# Patient Record
Sex: FEMALE | Race: WHITE | Employment: UNEMPLOYED | URBAN - METROPOLITAN AREA
[De-identification: names, ages, dates, MRNs, and addresses within clinical notes are randomized per-mention and may not be internally consistent; named-entity substitution may affect disease eponyms.]

---

## 2019-03-27 ENCOUNTER — OFFICE VISIT (OUTPATIENT)
Dept: HEMATOLOGY | Age: 69
End: 2019-03-27

## 2019-03-27 ENCOUNTER — HOSPITAL ENCOUNTER (OUTPATIENT)
Dept: LAB | Age: 69
Discharge: HOME | End: 2019-03-27
Payer: COMMERCIAL

## 2019-03-27 VITALS
DIASTOLIC BLOOD PRESSURE: 48 MMHG | SYSTOLIC BLOOD PRESSURE: 130 MMHG | OXYGEN SATURATION: 98 % | TEMPERATURE: 97.5 F | WEIGHT: 293 LBS | BODY MASS INDEX: 48.82 KG/M2 | HEART RATE: 88 BPM | HEIGHT: 65 IN

## 2019-03-27 DIAGNOSIS — K74.60 CIRRHOSIS OF LIVER WITH ASCITES, UNSPECIFIED HEPATIC CIRRHOSIS TYPE (HCC): ICD-10-CM

## 2019-03-27 DIAGNOSIS — R18.8 CIRRHOSIS OF LIVER WITH ASCITES, UNSPECIFIED HEPATIC CIRRHOSIS TYPE (HCC): Primary | ICD-10-CM

## 2019-03-27 DIAGNOSIS — Z11.59 ENCOUNTER FOR SCREENING FOR OTHER VIRAL DISEASES: ICD-10-CM

## 2019-03-27 DIAGNOSIS — K74.60 CIRRHOSIS OF LIVER WITH ASCITES, UNSPECIFIED HEPATIC CIRRHOSIS TYPE (HCC): Primary | ICD-10-CM

## 2019-03-27 DIAGNOSIS — R18.8 CIRRHOSIS OF LIVER WITH ASCITES, UNSPECIFIED HEPATIC CIRRHOSIS TYPE (HCC): ICD-10-CM

## 2019-03-27 PROBLEM — E66.01 OBESITY, MORBID (HCC): Status: ACTIVE | Noted: 2019-03-27

## 2019-03-27 PROCEDURE — 82103 ALPHA-1-ANTITRYPSIN TOTAL: CPT

## 2019-03-27 PROCEDURE — 86704 HEP B CORE ANTIBODY TOTAL: CPT

## 2019-03-27 PROCEDURE — 36415 COLL VENOUS BLD VENIPUNCTURE: CPT

## 2019-03-27 PROCEDURE — 83516 IMMUNOASSAY NONANTIBODY: CPT

## 2019-03-27 PROCEDURE — 82728 ASSAY OF FERRITIN: CPT

## 2019-03-27 PROCEDURE — 86708 HEPATITIS A ANTIBODY: CPT

## 2019-03-27 PROCEDURE — 82390 ASSAY OF CERULOPLASMIN: CPT

## 2019-03-27 PROCEDURE — 83540 ASSAY OF IRON: CPT

## 2019-03-27 PROCEDURE — 86038 ANTINUCLEAR ANTIBODIES: CPT

## 2019-03-27 PROCEDURE — 86256 FLUORESCENT ANTIBODY TITER: CPT

## 2019-03-27 PROCEDURE — 86706 HEP B SURFACE ANTIBODY: CPT

## 2019-03-27 RX ORDER — METFORMIN HYDROCHLORIDE 500 MG/1
TABLET ORAL 2 TIMES DAILY WITH MEALS
COMMUNITY

## 2019-03-27 RX ORDER — FLUTICASONE PROPIONATE AND SALMETEROL 500; 50 UG/1; UG/1
1 POWDER RESPIRATORY (INHALATION) EVERY 12 HOURS
COMMUNITY

## 2019-03-27 RX ORDER — SPIRONOLACTONE 100 MG/1
300 TABLET, FILM COATED ORAL DAILY
Qty: 90 TAB | Refills: 3 | Status: SHIPPED | OUTPATIENT
Start: 2019-03-27

## 2019-03-27 RX ORDER — CYCLOBENZAPRINE HCL 10 MG
TABLET ORAL
COMMUNITY

## 2019-03-27 RX ORDER — DICLOFENAC SODIUM 10 MG/G
GEL TOPICAL 4 TIMES DAILY
Status: ON HOLD | COMMUNITY
End: 2019-06-10

## 2019-03-27 RX ORDER — ALBUTEROL SULFATE 90 UG/1
AEROSOL, METERED RESPIRATORY (INHALATION)
COMMUNITY

## 2019-03-27 RX ORDER — GUAIFENESIN 100 MG/5ML
81 LIQUID (ML) ORAL DAILY
Status: ON HOLD | COMMUNITY
End: 2019-06-10

## 2019-03-27 RX ORDER — LISINOPRIL 10 MG/1
TABLET ORAL DAILY
COMMUNITY

## 2019-03-27 RX ORDER — LANCETS
EACH MISCELLANEOUS
COMMUNITY

## 2019-03-27 RX ORDER — IRON POLYSACCHARIDE COMPLEX 150 MG
150 CAPSULE ORAL 2 TIMES DAILY
Status: ON HOLD | COMMUNITY
End: 2019-06-10

## 2019-03-27 RX ORDER — FUROSEMIDE 40 MG/1
TABLET ORAL DAILY
COMMUNITY

## 2019-03-27 RX ORDER — SIMVASTATIN 40 MG/1
TABLET, FILM COATED ORAL
COMMUNITY
End: 2019-07-17

## 2019-03-27 RX ORDER — SPIRONOLACTONE 25 MG/1
TABLET ORAL DAILY
COMMUNITY
End: 2019-03-27

## 2019-03-27 RX ORDER — OMEPRAZOLE 40 MG/1
40 CAPSULE, DELAYED RELEASE ORAL DAILY
Status: ON HOLD | COMMUNITY
End: 2019-06-10

## 2019-03-27 RX ORDER — ALLOPURINOL 100 MG/1
TABLET ORAL DAILY
COMMUNITY

## 2019-03-27 RX ORDER — GABAPENTIN 300 MG/1
300 CAPSULE ORAL 3 TIMES DAILY
COMMUNITY

## 2019-03-27 NOTE — PROGRESS NOTES
1. Have you been to the ER, urgent care clinic since your last visit? Hospitalized since your last visit? Yes, Jatinder Hickman    2. Have you seen or consulted any other health care providers outside of the [facility] since your last visit? Include any pap smears or colon screening.  No

## 2019-03-27 NOTE — PROGRESS NOTES
[address] [address] [address], [place], [address], [place], [place]       Frank Tan, NICKY Call, PA-C    Nikhil Kirk, ACNP-BC   Gary Wong, NICKY Sung, NICKY [place] [facility] 136    at [address], [address] Yasmin Osman  22.    [phone]    FAX: [address]    [facility]    [address], [address] [address]    [address]    [phone]    FAX: [phone]       No care team member to display      Problem List  Never Reviewed          Codes Class Noted    Other cirrhosis of liver ([facility].) ICD-10-CM: K74.69  ICD-9-CM: 571.5  3/31/2019        COPD (chronic obstructive pulmonary disease) ([facility].) ICD-10-CM: J44.9  ICD-9-CM: 496  3/31/2019        Hypertension ICD-10-CM: I10  ICD-9-CM: 401.9  3/31/2019        Hyperlipidemia ICD-10-CM: E78.5  ICD-9-CM: 272.4  3/31/2019        Type 2 diabetes mellitus without complication ([facility].) RSV-08-CM: E11.9  ICD-9-CM: 250.00  3/31/2019        GERD (gastroesophageal reflux disease) ICD-10-CM: K21.9  ICD-9-CM: 530.81  3/31/2019        Asthma ICD-10-CM: J45.909  ICD-9-CM: 493.90  3/31/2019        Thrombocytopenia ([facility] 75.) ICD-10-CM: D69.6  ICD-9-CM: 287.5  3/31/2019        Anasarca ICD-10-CM: R60.1  ICD-9-CM: 782.3  3/31/2019        Obesity, morbid ([facility] 75.) ICD-10-CM: E66.01  ICD-9-CM: 278.01  3/27/2019              The clinicians listed above have asked me to see Bg Jerez in consultation regarding management of cirrhosis which is presumed secondary to non-alcoholic steatohepatitis (KAPLAN). All medical records sent by the referring physicians were reviewed including imaging studies. The patient is a 76 y.o.   female who was found to have chronic liver disease and cirrhosis in 3/2019 when she went to the ER for shortness of breath, 30-40 pound weight gain over 3 week period. An assessment of liver fibrosis with biopsy or elastography has not been performed. Serologic evaluation for markers of chronic liver disease was negative for HCV, HBV,     The patient has developed the following complications of cirrhosis: edema,     The patient notes fatigue, shortness of breath, swelling of the abdomen, swelling of the lower extremities,     The patient has not experienced pain in the right side over the liver, yellowing of the eyes or skin,      The patient completes all daily activities without any functional limitations. ASSESSMENT AND PLAN:  Cirrhosis  Cirrhosis of unclear etiology. The diagnosis of cirrhosis is based upon imaging, laboratory studies, complications of cirrhosis. AST is elevated. ALT is normal.  ALP is elevated. Liver function is depressed. The platelet count is depressed. Based upon laboratory studies and imaging the patient appears to have cirrhosis. Have performed laboratory testing to monitor liver function and degree of liver injury. This included BMP, hepatic panel, CBC with platelet count, INR. Will perform additional serologic tests to screen for other causes of chronic liver disease. The patient has depressed but stable liver function. Unable to calculate MELD at this time. No INR results available. Ascites   Ascites is only small amount as suggested by CT scan. Anasarca   Will increase the dose of diuretics to step 3. The patient was counseled regarding the need to maintain sodium restriction and the types of foods containing high amounts of sodium to be avoided. Lower extremity edema  Edema is persistent despite current dose of diuretics. Will increase doses of diuretics to step 3. Screening for Esophageal varices   The patient has not had an EGD to screen for varices. Will schedule for EGD to assess for varices and need for banding.     Hepatic encephalopathy   Overt HE has not developed to date. There is no need for treatment with lactulose and/or Xifaxan at this time. There is no need to restrict dietary protein at this time. Thrombocytopenia   This is secondary to cirrhosis. There is no evidence of overt bleeding. No treatment is required. Anemia   This is of unclear etiology. Will obtain FE panel to assess for iron stores. Screening for Hepatocellular Carcinoma  HCC screening has recently been performed and does not suggest [address] The next liver imaging study will be performed in 9/2019. Treatment of other medical problems in patients with chronic liver disease  There are no contraindications for the patient to take most medications that are necessary for treatment of other medical issues. The patient has cirrhrosis and should avoid the following medications:  Benzodiazapines which could exacerbate HE.  NSAIDs which are associated with a higher rate of developing ROSELINE. The patient can take the following medications as these will not impact the patient's current liver disease. Any medications utilized for treatment of DM  Statins to treat hypercholesterolemia  The patient does not consume alcohol on a daily basis. Normal doses of acetaminophen, as recommended on the label of the bottle, are not hepatotoxic except in the setting of daily alcohol use, even in patients with cirrhosis and can be utilized for pain. Counseling for alcohol in patients with chronic liver disease  The patient has cirrhosis and was advised to be abstinent from all alcohol including non-alcoholic beer which does contain some alcohol. The patient has not consumed alcohol since 2001. Osteoporosis  The risk of osteoporosis is increased in patients with cirrhosis. DEXA bone density to assess for osteoporosis has not been performed. This should be ordered by the patients primary care physician.     Vaccinations   The need for vaccination against viral hepatitis A and B will be assessed with serologic and instituted as appropriate. Since the patient does not have a chronic liver disease which can lead to liver injury screening for HAV and HBV is not needed. Routine vaccinations against other bacterial and viral agents can be performed as indicated. Annual flu vaccination should be administered if indicated. ALLERGIES  Allergies   Allergen Reactions    Benadryl [Diphenhydramine Hcl] Anaphylaxis    Ibuprofen Anaphylaxis    Bactrim [Sulfamethoprim] Rash    Penicillins Angioedema       MEDICATIONS  Current Outpatient Medications   Medication Sig    albuterol (PROVENTIL HFA, VENTOLIN HFA, PROAIR HFA) 90 mcg/actuation inhaler Take  by inhalation.  allopurinol (ZYLOPRIM) 100 mg tablet Take  by mouth daily.  aspirin 81 mg chewable tablet Take 81 mg by mouth daily.  cyclobenzaprine (FLEXERIL) 10 mg tablet Take  by mouth three (3) times daily as needed for Muscle Spasm(s).  diclofenac (VOLTAREN) 1 % gel Apply  to affected area four (4) times daily.  fluticasone propionate (FLOVENT DISKUS) 50 mcg/actuation inhaler Take  by inhalation.  fluticasone propion-salmeterol (ADVAIR) 500-50 mcg/dose diskus inhaler Take 1 Puff by inhalation every twelve (12) hours.  furosemide (LASIX) 40 mg tablet Take  by mouth daily.  gabapentin (NEURONTIN) 300 mg capsule Take 300 mg by mouth three (3) times daily.  iron polysaccharides (NIFEREX) 150 mg iron capsule Take 150 mg by mouth two (2) times a day.  lancets misc by Does Not Apply route.  lisinopril (PRINIVIL, ZESTRIL) 10 mg tablet Take  by mouth daily.  metFORMIN (GLUCOPHAGE) 500 mg tablet Take  by mouth two (2) times daily (with meals).  omeprazole (PRILOSEC) 40 mg capsule Take 40 mg by mouth daily.  simvastatin (ZOCOR) 40 mg tablet Take  by mouth nightly.  spironolactone (ALDACTONE) 100 mg tablet Take 3 Tabs by mouth daily. Indications:  Accumulation of Fluid caused by Cirrhosis of the Liver     No current facility-administered medications for this visit. SYSTEM REVIEW NOT RELATED TO LIVER DISEASE OR REVIEWED ABOVE:  Constitution systems: Negative for fever, chills, + weight gain, anasarca  Eyes: Negative for visual changes. ENT: Negative for sore throat, painful swallowing. Respiratory: Negative for cough, hemoptysis, +SOB. Cardiology: Negative for chest pain, palpitations. GI:  Negative for constipation or diarrhea. : Negative for urinary frequency, dysuria, hematuria, nocturia. Skin: Negative for rash. Hematology: +easy bruising, blood clots. Musculo-skelatal: Negative for back pain, muscle pain, weakness. Neurologic: Negative for headaches, dizziness, vertigo, memory problems not related to HE. Psychology: Negative for anxiety, depression. FAMILY HISTORY:  The father  of unknown cause. The mother  of cirrhosis r/t alcohol. There is no family history of immune disorders. SOCIAL HISTORY:  The patient is . The patient has 2 children, and 12 grandchildren. The patient stopped using tobacco products in . The patient has never consumed significant amounts of alcohol. The patient has been abstinent from alcohol since . The patient retired in  a  at [employer]. PHYSICAL EXAMINATION:  Visit Vitals  /48 (BP 1 Location: Left arm, BP Patient Position: Sitting)   Pulse 88   Temp 97.5 °F (36.4 °C)   Ht 5' 5\" (1.651 m)   Wt 331 lb (150.1 kg)   SpO2 98%   BMI 55.08 kg/m²     General: No acute distress. Eyes: Sclera anicteric. ENT: No oral lesions. Thyroid normal.  Nodes: No adenopathy. Skin: No spider angiomata. No jaundice. No palmar erythema. Respiratory: Lungs clear to auscultation. Cardiovascular: Regular heart rate. No murmurs. No JVD. Abdomen: Soft non-tender. Unable to palpate for liver or spleen due to body habitus  Extremities: 4+ pitting edema No muscle wasting.   No gross arthritic changes. Neurologic: Alert and oriented. Cranial nerves grossly intact. No asterixis. LABORATORY STUDIES:  From 3/2019  AST/ALT/ALP/T Bili/ALB: 99/35/138/2.8/2.5  WBC/HB/PLT/INR: 4.1/9.1/89  BUN/CREAT: 14/0.6    SEROLOGIES:  3/2019. HBsurface antigen negative, anti-HCV negative  Other testing will be performed as indicated. LIVER HISTOLOGY:  Not available or performed    ENDOSCOPIC PROCEDURES:  Not available or performed    RADIOLOGY:  2018. Ultrasound of liver. Echogenic consistent with cirrhosis. No liver mass lesions. No dilated bile ducts. No ascites. 3/2019. CT scan abdomen with and without IV contrast. Changes consistent with cirrhosis. No liver mass lesions. No dilated bile ducts. Mild ascites. OTHER TESTIN2018. ECHO- normal    FOLLOW-UP:  All of the issues listed above in the Assessment and Plan were discussed with the patient. All questions were answered. The patient expressed a clear understanding of the above. [address] [address] in 2 weeks to assess the effects and tolerability of step 3 diuretics.  BEKAH Sauceda-BC  [address] Liver [place] [facility]  [address], [address] [address]  [facility], [address]  [phone]

## 2019-03-28 LAB
FERRITIN SERPL-MCNC: 23 NG/ML (ref 8–388)
HBV SURFACE AB SER QL IA: NEGATIVE
HBV SURFACE AB SERPL IA-ACNC: <3.1 MIU/ML
HEP BS AB COMMENT,HBSAC: ABNORMAL
IRON SATN MFR SERPL: 11 %
IRON SERPL-MCNC: 36 UG/DL (ref 50–175)
TIBC SERPL-MCNC: 316 UG/DL (ref 250–450)

## 2019-03-29 LAB
A1AT SERPL-MCNC: 171 MG/DL (ref 90–200)
ACTIN IGG SERPL-ACNC: 26 UNITS (ref 0–19)
CERULOPLASMIN SERPL-MCNC: 24.7 MG/DL (ref 19–39)
HAV AB SER QL IA: POSITIVE
HBV CORE AB SERPL QL IA: NEGATIVE
MITOCHONDRIA M2 IGG SER-ACNC: <20 UNITS (ref 0–20)

## 2019-03-31 PROBLEM — E11.9 TYPE 2 DIABETES MELLITUS WITHOUT COMPLICATION (HCC): Status: ACTIVE | Noted: 2019-03-31

## 2019-03-31 PROBLEM — K21.9 GERD (GASTROESOPHAGEAL REFLUX DISEASE): Status: ACTIVE | Noted: 2019-03-31

## 2019-03-31 PROBLEM — R60.1 ANASARCA: Status: ACTIVE | Noted: 2019-03-31

## 2019-03-31 PROBLEM — K74.69 OTHER CIRRHOSIS OF LIVER (HCC): Status: ACTIVE | Noted: 2019-03-31

## 2019-03-31 PROBLEM — E78.5 HYPERLIPIDEMIA: Status: ACTIVE | Noted: 2019-03-31

## 2019-03-31 PROBLEM — D69.6 THROMBOCYTOPENIA (HCC): Status: ACTIVE | Noted: 2019-03-31

## 2019-03-31 PROBLEM — J45.909 ASTHMA: Status: ACTIVE | Noted: 2019-03-31

## 2019-03-31 PROBLEM — J44.9 COPD (CHRONIC OBSTRUCTIVE PULMONARY DISEASE) (HCC): Status: ACTIVE | Noted: 2019-03-31

## 2019-03-31 PROBLEM — I10 HYPERTENSION: Status: ACTIVE | Noted: 2019-03-31

## 2019-04-01 LAB
ANA HOMOGEN TITR SER: ABNORMAL {TITER}
ANA TITR SER IF: POSITIVE {TITER}
C-ANCA TITR SER IF: NORMAL TITER
NOTE:, 016270: ABNORMAL
P-ANCA ATYPICAL TITR SER IF: NORMAL TITER
P-ANCA TITR SER IF: NORMAL TITER

## 2019-04-04 ENCOUNTER — HOSPITAL ENCOUNTER (OUTPATIENT)
Dept: ULTRASOUND IMAGING | Age: 69
Discharge: HOME | End: 2019-04-04
Attending: NURSE PRACTITIONER
Payer: COMMERCIAL

## 2019-04-04 DIAGNOSIS — R18.8 CIRRHOSIS OF LIVER WITH ASCITES, UNSPECIFIED HEPATIC CIRRHOSIS TYPE (HCC): ICD-10-CM

## 2019-04-04 DIAGNOSIS — K74.60 CIRRHOSIS OF LIVER WITH ASCITES, UNSPECIFIED HEPATIC CIRRHOSIS TYPE (HCC): ICD-10-CM

## 2019-04-04 PROCEDURE — 76705 ECHO EXAM OF ABDOMEN: CPT

## 2019-04-08 ENCOUNTER — HOSPITAL ENCOUNTER (OUTPATIENT)
Dept: LAB | Age: 69
Discharge: HOME | End: 2019-04-08
Payer: COMMERCIAL

## 2019-04-08 ENCOUNTER — OFFICE VISIT (OUTPATIENT)
Dept: HEMATOLOGY | Age: 69
End: 2019-04-08

## 2019-04-08 VITALS
TEMPERATURE: 98.4 F | OXYGEN SATURATION: 94 % | HEIGHT: 65 IN | WEIGHT: 293 LBS | BODY MASS INDEX: 48.82 KG/M2 | DIASTOLIC BLOOD PRESSURE: 53 MMHG | HEART RATE: 89 BPM | SYSTOLIC BLOOD PRESSURE: 131 MMHG

## 2019-04-08 DIAGNOSIS — K74.60 CIRRHOSIS OF LIVER WITH ASCITES, UNSPECIFIED HEPATIC CIRRHOSIS TYPE (HCC): ICD-10-CM

## 2019-04-08 DIAGNOSIS — K74.60 CIRRHOSIS OF LIVER WITH ASCITES, UNSPECIFIED HEPATIC CIRRHOSIS TYPE (HCC): Primary | ICD-10-CM

## 2019-04-08 DIAGNOSIS — R18.8 CIRRHOSIS OF LIVER WITH ASCITES, UNSPECIFIED HEPATIC CIRRHOSIS TYPE (HCC): Primary | ICD-10-CM

## 2019-04-08 DIAGNOSIS — R18.8 CIRRHOSIS OF LIVER WITH ASCITES, UNSPECIFIED HEPATIC CIRRHOSIS TYPE (HCC): ICD-10-CM

## 2019-04-08 LAB
ALBUMIN SERPL-MCNC: 2.6 G/DL (ref 3.4–5)
ALBUMIN/GLOB SERPL: 0.7 {RATIO} (ref 0.8–1.7)
ALP SERPL-CCNC: 170 U/L (ref 45–117)
ALT SERPL-CCNC: 40 U/L (ref 13–56)
ANION GAP SERPL CALC-SCNC: 9 MMOL/L (ref 3–18)
AST SERPL-CCNC: 99 U/L (ref 15–37)
BASOPHILS # BLD: 0 K/UL (ref 0–0.1)
BASOPHILS NFR BLD: 1 % (ref 0–2)
BILIRUB DIRECT SERPL-MCNC: 1.8 MG/DL (ref 0–0.2)
BILIRUB SERPL-MCNC: 3.4 MG/DL (ref 0.2–1)
BUN SERPL-MCNC: 10 MG/DL (ref 7–18)
BUN/CREAT SERPL: 11 (ref 12–20)
CALCIUM SERPL-MCNC: 8.7 MG/DL (ref 8.5–10.1)
CHLORIDE SERPL-SCNC: 98 MMOL/L (ref 100–108)
CO2 SERPL-SCNC: 30 MMOL/L (ref 21–32)
CREAT SERPL-MCNC: 0.88 MG/DL (ref 0.6–1.3)
DIFFERENTIAL METHOD BLD: ABNORMAL
EOSINOPHIL # BLD: 0.2 K/UL (ref 0–0.4)
EOSINOPHIL NFR BLD: 4 % (ref 0–5)
ERYTHROCYTE [DISTWIDTH] IN BLOOD BY AUTOMATED COUNT: 17.8 % (ref 11.6–14.5)
GLOBULIN SER CALC-MCNC: 3.9 G/DL (ref 2–4)
GLUCOSE SERPL-MCNC: 91 MG/DL (ref 74–99)
HCT VFR BLD AUTO: 32.2 % (ref 35–45)
HGB BLD-MCNC: 10.2 G/DL (ref 12–16)
INR PPP: 1.5 (ref 0.8–1.2)
LYMPHOCYTES # BLD: 1 K/UL (ref 0.9–3.6)
LYMPHOCYTES NFR BLD: 20 % (ref 21–52)
MCH RBC QN AUTO: 28.8 PG (ref 24–34)
MCHC RBC AUTO-ENTMCNC: 31.7 G/DL (ref 31–37)
MCV RBC AUTO: 91 FL (ref 74–97)
MONOCYTES # BLD: 0.5 K/UL (ref 0.05–1.2)
MONOCYTES NFR BLD: 11 % (ref 3–10)
NEUTS SEG # BLD: 3.1 K/UL (ref 1.8–8)
NEUTS SEG NFR BLD: 64 % (ref 40–73)
PLATELET # BLD AUTO: 122 K/UL (ref 135–420)
PMV BLD AUTO: 9.1 FL (ref 9.2–11.8)
POTASSIUM SERPL-SCNC: 3.4 MMOL/L (ref 3.5–5.5)
PROT SERPL-MCNC: 6.5 G/DL (ref 6.4–8.2)
PROTHROMBIN TIME: 18 SEC (ref 11.5–15.2)
RBC # BLD AUTO: 3.54 M/UL (ref 4.2–5.3)
SODIUM SERPL-SCNC: 137 MMOL/L (ref 136–145)
WBC # BLD AUTO: 4.8 K/UL (ref 4.6–13.2)

## 2019-04-08 PROCEDURE — 85025 COMPLETE CBC W/AUTO DIFF WBC: CPT

## 2019-04-08 PROCEDURE — 85610 PROTHROMBIN TIME: CPT

## 2019-04-08 PROCEDURE — 36415 COLL VENOUS BLD VENIPUNCTURE: CPT

## 2019-04-08 PROCEDURE — 80076 HEPATIC FUNCTION PANEL: CPT

## 2019-04-08 PROCEDURE — 80048 BASIC METABOLIC PNL TOTAL CA: CPT

## 2019-04-08 NOTE — PROGRESS NOTES
[address] [facility], [place], FACP, [place], [place]      THAD Esparza, ACNP-BC     April Jorge Bundy, NICKY Leal, NICKY Stinson, NICKY [place] [facility] 136    at [address]    [address] [facility], [address] [place] [address], [facility] 22.    [phone]    FAX: [address]    at [address], [address]    [phone]    FAX: [phone]         Patient Care Team:  Jyoti Vu MD as PCP - General (Family Practice)      Problem List  Date Reviewed: 3/31/2019          Codes Class Noted    Cirrhosis [facility]) ICD-10-CM: K74.60  ICD-9-CM: 571.5  3/31/2019        COPD (chronic obstructive pulmonary disease) ([facility].) ICD-10-CM: J44.9  ICD-9-CM: 496  3/31/2019        Hypertension ICD-10-CM: I10  ICD-9-CM: 401.9  3/31/2019        Hyperlipidemia ICD-10-CM: E78.5  ICD-9-CM: 272.4  3/31/2019        Type 2 diabetes mellitus without complication ([facility].) NHJ-07-AR: E11.9  ICD-9-CM: 250.00  3/31/2019        GERD (gastroesophageal reflux disease) ICD-10-CM: K21.9  ICD-9-CM: 530.81  3/31/2019        Asthma ICD-10-CM: J45.909  ICD-9-CM: 493.90  3/31/2019        Thrombocytopenia ([facility].) ICD-10-CM: D69.6  ICD-9-CM: 287.5  3/31/2019        Anasarca ICD-10-CM: R60.1  ICD-9-CM: 782.3  3/31/2019        Obesity, morbid ([facility].) ICD-10-CM: E66.01  ICD-9-CM: 278.01  3/27/2019              Mich Kincaid returns to the [address] for management of cirrhosis of undefined etiology. The active problem list, all pertinent past medical history, medications, radiologic findings and laboratory findings related to the liver disorder were reviewed with the patient. The patient is a 76 y.o.   female who was found to have chronic liver disease and cirrhosis in 3/2019 when she developed weight gain and edema     An assessment of liver fibrosis with biopsy or elastography has not been performed. Serologic evaluation for markers of chronic liver disease was positive for CRYSTAL, ASMA. The patient has developed the following complications of cirrhosis: edema,     Since the last office appointment the patient has: The patient has had a marked improvement in edema and 30 pound weight loss on step 2 diuretics. The patient notes fatigue, swelling of the abdomen has resolved, less swelling of the lower extremities,     The patient has not experienced pain in the right side over the liver, yellowing of the eyes or skin,      The patient completes all daily activities without any functional limitations. ASSESSMENT AND PLAN:  Cirrhosis  Cirrhosis of unclear etiology. The diagnosis of cirrhosis is based upon imaging, laboratory studies, complications of cirrhosis. The CTP is 10. Child class C. The MELD score is 18. Serology is positive for CRYSTAL and ASMA and this may be AIH. Have performed laboratory testing to monitor liver function and degree of liver injury. This included BMP, hepatic panel, CBC with platelet count, INR. AST is elevated. ALT is normal.  ALP is elevated. Liver function is depressed. The platelet count is depressed. Ascites   Ascites has resolved with current dose of diuretics. The patient was counseled regarding the need to maintain sodium restriction and the types of foods containing high amounts of sodium to be avoided. Lower extremity edema  Edema is greatly improved on step 2 diuretics. She has lost about 30 pounds since the initial appointment 2 weeks ago. There is still some edema. Will increase doses of diuretics to step 3. The renal function is normal and edema should be able to be controled with diuretics. Screening for Esophageal varices   The patient has not had an EGD to screen for varices.     Will schedule for EGD to assess for varices and need for banding. Hepatic encephalopathy   HE has not developed to date. There is no need for treatment with lactulose and/or Xifaxan at this time. There is no need to restrict dietary protein at this time. Thrombocytopenia   This is secondary to cirrhosis. There is no evidence of overt bleeding. No treatment is required. Anemia   This is of unclear etiology. Will obtain FE panel to assess for iron stores. Screening for Hepatocellular Carcinoma  HCC screening has recently been performed and does not suggest [facility] The next liver imaging study will be performed in 9/2019. Treatment of other medical problems in patients with chronic liver disease  There are no contraindications for the patient to take most medications that are necessary for treatment of other medical issues. The patient has cirrhrosis and should avoid the following medications:  NSAIDs which are associated with a higher rate of developing ROSELINE. The patient can take the following medications as these will not impact the patient's current liver disease. Any medications utilized for treatment of DM  Statins to treat hypercholesterolemia    The patient does not consume alcohol on a daily basis. Normal doses of acetaminophen, as recommended on the label of the bottle, are not hepatotoxic except in the setting of daily alcohol use, even in patients with cirrhosis and can be utilized for pain. Counseling for alcohol in patients with chronic liver disease  The patient has cirrhosis and was advised to be abstinent from all alcohol including non-alcoholic beer which does contain some alcohol. The patient has not consumed alcohol since 2001. Osteoporosis  The risk of osteoporosis is increased in patients with cirrhosis. DEXA bone density to assess for osteoporosis has not been performed. This should be ordered by the patients primary care physician.     Vaccinations Vaccination for viral hepatitis B is recommended since the patient has no serologic evidence of previous exposure or vaccination with immunity. Vaccination for viral hepatitis A is not needed. The patient has serologic evidence of prior exposure or vaccination with immunity. Routine vaccinations against other bacterial and viral agents can be performed as indicated. Annual flu vaccination should be administered if indicated. ALLERGIES  Allergies   Allergen Reactions    Benadryl [Diphenhydramine Hcl] Anaphylaxis    Ibuprofen Anaphylaxis    Bactrim [Sulfamethoprim] Rash    Penicillins Angioedema       MEDICATIONS  Current Outpatient Medications   Medication Sig    albuterol (PROVENTIL HFA, VENTOLIN HFA, PROAIR HFA) 90 mcg/actuation inhaler Take  by inhalation.  allopurinol (ZYLOPRIM) 100 mg tablet Take  by mouth daily.  aspirin 81 mg chewable tablet Take 81 mg by mouth daily.  cyclobenzaprine (FLEXERIL) 10 mg tablet Take  by mouth three (3) times daily as needed for Muscle Spasm(s).  diclofenac (VOLTAREN) 1 % gel Apply  to affected area four (4) times daily.  fluticasone propionate (FLOVENT DISKUS) 50 mcg/actuation inhaler Take  by inhalation.  fluticasone propion-salmeterol (ADVAIR) 500-50 mcg/dose diskus inhaler Take 1 Puff by inhalation every twelve (12) hours.  furosemide (LASIX) 40 mg tablet Take  by mouth daily.  gabapentin (NEURONTIN) 300 mg capsule Take 300 mg by mouth three (3) times daily.  iron polysaccharides (NIFEREX) 150 mg iron capsule Take 150 mg by mouth two (2) times a day.  lancets misc by Does Not Apply route.  lisinopril (PRINIVIL, ZESTRIL) 10 mg tablet Take  by mouth daily.  metFORMIN (GLUCOPHAGE) 500 mg tablet Take  by mouth two (2) times daily (with meals).  omeprazole (PRILOSEC) 40 mg capsule Take 40 mg by mouth daily.  simvastatin (ZOCOR) 40 mg tablet Take  by mouth nightly.     spironolactone (ALDACTONE) 100 mg tablet Take 3 Tabs by mouth daily. Indications: Accumulation of Fluid caused by Cirrhosis of the Liver     No current facility-administered medications for this visit. SYSTEM REVIEW NOT RELATED TO LIVER DISEASE OR REVIEWED ABOVE:  Constitution systems: Negative for fever, chills,   Eyes: Negative for visual changes. ENT: Negative for sore throat, painful swallowing. Respiratory: Negative for cough, hemoptysis,   Cardiology: Negative for chest pain, palpitations. GI:  Negative for constipation or diarrhea. : Negative for urinary frequency, dysuria, hematuria, nocturia. Skin: Negative for rash. Hematology: easy bruising,   Musculo-skelatal: Negative for back pain, muscle pain, weakness. Neurologic: Negative for headaches, dizziness, vertigo, memory problems not related to HE. Psychology: Negative for anxiety, depression. FAMILY HISTORY:  The father  of unknown cause. The mother  of cirrhosis r/t alcohol. There is no family history of immune disorders. SOCIAL HISTORY:  The patient is . The patient has 2 children, and 12 grandchildren. The patient stopped using tobacco products in . The patient has never consumed significant amounts of alcohol. The patient has been abstinent from alcohol since . The patient retired in  a  at [employer]. PHYSICAL EXAMINATION:  Visit Vitals  /53   Pulse 89   Temp 98.4 °F (36.9 °C) (Tympanic)   Ht 5' 5\" (1.651 m)   Wt 302 lb 4 oz (137.1 kg)   SpO2 94%   BMI 50.30 kg/m²     General: No acute distress. Eyes: Sclera anicteric. ENT: No oral lesions. Thyroid normal.  Nodes: No adenopathy. Skin: No spider angiomata. No jaundice. No palmar erythema. Respiratory: Lungs clear to auscultation. Cardiovascular: Regular heart rate. No murmurs. No JVD. Abdomen: Soft non-tender. Unable to palpate for liver or spleen due to body habitus. No obvious ascites  Extremities: 2+ pitting edema No muscle wasting.   No gross arthritic changes. Neurologic: Alert and oriented. Cranial nerves grossly intact. No asterixis. LABORATORY STUDIES:  Liver [place] of [address] Units 2019   WBC 4.6 - 13.2 K/uL 4.8   ANC 1.8 - 8.0 K/UL 3.1   HGB 12.0 - 16.0 g/dL 10.2 (L)    - 420 K/uL 122 (L)   INR 0.8 - 1.2   1.5 (H)   AST 15 - 37 U/L 99 (H)   ALT 13 - 56 U/L 40   Alk Phos 45 - 117 U/L 170 (H)   Bili, Total 0.2 - 1.0 MG/DL 3.4 (H)   Bili, Direct 0.0 - 0.2 MG/DL 1.8 (H)   Albumin 3.4 - 5.0 g/dL 2.6 (L)   BUN 7.0 - 18 MG/DL 10   Creat 0.6 - 1.3 MG/DL 0.88   Na 136 - 145 mmol/L 137   K 3.5 - 5.5 mmol/L 3.4 (L)   Cl 100 - 108 mmol/L 98 (L)   CO2 21 - 32 mmol/L 30   Glucose 74 - 99 mg/dL 91     SEROLOGIES:  3/2019. HBsurface antigen negative, anti-HCV negative    Serologies Latest Ref Rng & Units 3/27/2019   Hep A Ab, Total NEGATIVE   Positive (A)   Hep B Core Ab, Total NEGATIVE   NEGATIVE   Hep B Surface Ab >10.0 mIU/mL <3.10 (L)   Hep B Surface Ab Interp POS   NEGATIVE (A)   Ferritin 8 - 388 NG/ML 23   Iron % Saturation % 11   CRYSTAL, IFA  Positive (A)   CRYSTAL Pattern  1:160 (H)   C-ANCA Neg:<1:20 titer <1:20   P-ANCA Neg:<1:20 titer <1:20   ANCA Neg:<1:20 titer <1:20   ASMCA 0 - 19 Units 26 (H)   M2 Ab 0.0 - 20.0 Units <20.0   Ceruloplasmin 19.0 - 39.0 mg/dL 24.7   Alpha-1 antitrypsin level 90 - 200 mg/dL 171     LIVER HISTOLOGY:  Not available or performed    ENDOSCOPIC PROCEDURES:  Not available or performed    RADIOLOGY:  2018. Ultrasound of liver. Echogenic consistent with cirrhosis. No liver mass lesions. No dilated bile ducts. No ascites. 3/2019. CT scan abdomen with and without IV contrast. Changes consistent with cirrhosis. No liver mass lesions. No dilated bile ducts. Mild ascites. OTHER TESTIN2018. ECHO- normal    FOLLOW-UP:  All of the issues listed above in the Assessment and Plan were discussed with the patient. All questions were answered.   The patient expressed a clear understanding of the above.    [address] [address] in 2 weeks to assess the effects and tolerability of step 3 diuretics.  MD Joao Wiseman 13 of [facility]  [address] [facility], [facility], [address]  12 [facility]

## 2019-04-24 ENCOUNTER — HOSPITAL ENCOUNTER (OUTPATIENT)
Dept: LAB | Age: 69
Discharge: HOME | End: 2019-04-24
Payer: COMMERCIAL

## 2019-04-24 ENCOUNTER — OFFICE VISIT (OUTPATIENT)
Dept: HEMATOLOGY | Age: 69
End: 2019-04-24

## 2019-04-24 VITALS
DIASTOLIC BLOOD PRESSURE: 54 MMHG | HEART RATE: 94 BPM | BODY MASS INDEX: 46.32 KG/M2 | RESPIRATION RATE: 18 BRPM | SYSTOLIC BLOOD PRESSURE: 122 MMHG | WEIGHT: 278 LBS | OXYGEN SATURATION: 96 % | HEIGHT: 65 IN | TEMPERATURE: 98.8 F

## 2019-04-24 DIAGNOSIS — K74.60 CIRRHOSIS OF LIVER WITH ASCITES, UNSPECIFIED HEPATIC CIRRHOSIS TYPE (HCC): Primary | ICD-10-CM

## 2019-04-24 DIAGNOSIS — R18.8 CIRRHOSIS OF LIVER WITH ASCITES, UNSPECIFIED HEPATIC CIRRHOSIS TYPE (HCC): ICD-10-CM

## 2019-04-24 DIAGNOSIS — K74.60 CIRRHOSIS OF LIVER WITH ASCITES, UNSPECIFIED HEPATIC CIRRHOSIS TYPE (HCC): ICD-10-CM

## 2019-04-24 DIAGNOSIS — R18.8 CIRRHOSIS OF LIVER WITH ASCITES, UNSPECIFIED HEPATIC CIRRHOSIS TYPE (HCC): Primary | ICD-10-CM

## 2019-04-24 LAB
ALBUMIN SERPL-MCNC: 2.7 G/DL (ref 3.4–5)
ALBUMIN/GLOB SERPL: 0.7 {RATIO} (ref 0.8–1.7)
ALP SERPL-CCNC: 142 U/L (ref 45–117)
ALT SERPL-CCNC: 39 U/L (ref 13–56)
ANION GAP SERPL CALC-SCNC: 11 MMOL/L (ref 3–18)
AST SERPL-CCNC: 92 U/L (ref 15–37)
BASOPHILS # BLD: 0.1 K/UL (ref 0–0.1)
BASOPHILS NFR BLD: 1 % (ref 0–2)
BILIRUB DIRECT SERPL-MCNC: 1.7 MG/DL (ref 0–0.2)
BILIRUB SERPL-MCNC: 3.2 MG/DL (ref 0.2–1)
BUN SERPL-MCNC: 9 MG/DL (ref 7–18)
BUN/CREAT SERPL: 11 (ref 12–20)
CALCIUM SERPL-MCNC: 9.1 MG/DL (ref 8.5–10.1)
CHLORIDE SERPL-SCNC: 99 MMOL/L (ref 100–108)
CO2 SERPL-SCNC: 29 MMOL/L (ref 21–32)
CREAT SERPL-MCNC: 0.8 MG/DL (ref 0.6–1.3)
DIFFERENTIAL METHOD BLD: ABNORMAL
EOSINOPHIL # BLD: 0.1 K/UL (ref 0–0.4)
EOSINOPHIL NFR BLD: 2 % (ref 0–5)
ERYTHROCYTE [DISTWIDTH] IN BLOOD BY AUTOMATED COUNT: 18 % (ref 11.6–14.5)
GLOBULIN SER CALC-MCNC: 3.9 G/DL (ref 2–4)
GLUCOSE SERPL-MCNC: 78 MG/DL (ref 74–99)
HCT VFR BLD AUTO: 31.9 % (ref 35–45)
HGB BLD-MCNC: 10.3 G/DL (ref 12–16)
INR PPP: 1.4 (ref 0.8–1.2)
LYMPHOCYTES # BLD: 1.2 K/UL (ref 0.9–3.6)
LYMPHOCYTES NFR BLD: 25 % (ref 21–52)
MCH RBC QN AUTO: 29 PG (ref 24–34)
MCHC RBC AUTO-ENTMCNC: 32.3 G/DL (ref 31–37)
MCV RBC AUTO: 89.9 FL (ref 74–97)
MONOCYTES # BLD: 0.4 K/UL (ref 0.05–1.2)
MONOCYTES NFR BLD: 9 % (ref 3–10)
NEUTS SEG # BLD: 3 K/UL (ref 1.8–8)
NEUTS SEG NFR BLD: 63 % (ref 40–73)
PLATELET # BLD AUTO: 105 K/UL (ref 135–420)
PMV BLD AUTO: 8.6 FL (ref 9.2–11.8)
POTASSIUM SERPL-SCNC: 3.5 MMOL/L (ref 3.5–5.5)
PROT SERPL-MCNC: 6.6 G/DL (ref 6.4–8.2)
PROTHROMBIN TIME: 17.4 SEC (ref 11.5–15.2)
RBC # BLD AUTO: 3.55 M/UL (ref 4.2–5.3)
SODIUM SERPL-SCNC: 139 MMOL/L (ref 136–145)
WBC # BLD AUTO: 4.7 K/UL (ref 4.6–13.2)

## 2019-04-24 PROCEDURE — 36415 COLL VENOUS BLD VENIPUNCTURE: CPT

## 2019-04-24 PROCEDURE — 85610 PROTHROMBIN TIME: CPT

## 2019-04-24 PROCEDURE — 80076 HEPATIC FUNCTION PANEL: CPT

## 2019-04-24 PROCEDURE — 85025 COMPLETE CBC W/AUTO DIFF WBC: CPT

## 2019-04-24 PROCEDURE — 80048 BASIC METABOLIC PNL TOTAL CA: CPT

## 2019-04-24 NOTE — Clinical Note
6/1/19 Patient: Cathy Lind YOB: 1950 Date of Visit: 4/24/2019 Kirk Bravo MD 
Patient Can Only Remember The Practice Name And Not The Physician 
VIA Dear Bernarda Bravo MD, Thank you for referring Ms. Keke Kincaid to [address] [place] [address] for evaluation. My notes for this consultation are attached. If you have questions, please do not hesitate to call me. I look forward to following your patient along with you. Sincerely, Pita James MD

## 2019-04-24 NOTE — PROGRESS NOTES
Christie Wilkes is a 76 y.o. female      1. Have you been to the ER, urgent care clinic or hospitalized since your last visit? NO.     2. Have you seen or consulted any other health care providers outside of the [address] [place] [address] since your last visit (Include any pap smears or colon screening)?  NON          Learning Assessment 4/24/2019   PRIMARY LEARNER Patient   BARRIERS PRIMARY LEARNER NONE   CO-LEARNER CAREGIVER No   PRIMARY LANGUAGE ENGLISH   LEARNER PREFERENCE PRIMARY LISTENING   ANSWERED BY PATIENT   RELATIONSHIP SELF

## 2019-05-30 ENCOUNTER — HOSPITAL ENCOUNTER (OUTPATIENT)
Dept: LAB | Age: 69
Discharge: HOME | End: 2019-05-30
Payer: COMMERCIAL

## 2019-05-30 ENCOUNTER — OFFICE VISIT (OUTPATIENT)
Dept: HEMATOLOGY | Age: 69
End: 2019-05-30

## 2019-05-30 VITALS
HEIGHT: 65 IN | WEIGHT: 271 LBS | OXYGEN SATURATION: 98 % | SYSTOLIC BLOOD PRESSURE: 104 MMHG | HEART RATE: 94 BPM | DIASTOLIC BLOOD PRESSURE: 53 MMHG | TEMPERATURE: 97.9 F | BODY MASS INDEX: 45.15 KG/M2

## 2019-05-30 DIAGNOSIS — R60.1 ANASARCA: ICD-10-CM

## 2019-05-30 DIAGNOSIS — K74.69 OTHER CIRRHOSIS OF LIVER (HCC): ICD-10-CM

## 2019-05-30 DIAGNOSIS — K74.69 OTHER CIRRHOSIS OF LIVER (HCC): Primary | ICD-10-CM

## 2019-05-30 LAB
ALBUMIN SERPL-MCNC: 2.6 G/DL (ref 3.4–5)
ALBUMIN/GLOB SERPL: 0.7 {RATIO} (ref 0.8–1.7)
ALP SERPL-CCNC: 134 U/L (ref 45–117)
ALT SERPL-CCNC: 38 U/L (ref 13–56)
ANION GAP SERPL CALC-SCNC: 9 MMOL/L (ref 3–18)
AST SERPL-CCNC: 98 U/L (ref 15–37)
BASOPHILS # BLD: 0.1 K/UL (ref 0–0.1)
BASOPHILS NFR BLD: 1 % (ref 0–2)
BILIRUB DIRECT SERPL-MCNC: 1.8 MG/DL (ref 0–0.2)
BILIRUB SERPL-MCNC: 3.4 MG/DL (ref 0.2–1)
BUN SERPL-MCNC: 15 MG/DL (ref 7–18)
BUN/CREAT SERPL: 13 (ref 12–20)
CALCIUM SERPL-MCNC: 8.8 MG/DL (ref 8.5–10.1)
CHLORIDE SERPL-SCNC: 98 MMOL/L (ref 100–108)
CO2 SERPL-SCNC: 29 MMOL/L (ref 21–32)
CREAT SERPL-MCNC: 1.19 MG/DL (ref 0.6–1.3)
DIFFERENTIAL METHOD BLD: ABNORMAL
EOSINOPHIL # BLD: 0.2 K/UL (ref 0–0.4)
EOSINOPHIL NFR BLD: 3 % (ref 0–5)
ERYTHROCYTE [DISTWIDTH] IN BLOOD BY AUTOMATED COUNT: 18.1 % (ref 11.6–14.5)
GLOBULIN SER CALC-MCNC: 4 G/DL (ref 2–4)
GLUCOSE SERPL-MCNC: 89 MG/DL (ref 74–99)
HCT VFR BLD AUTO: 33.1 % (ref 35–45)
HGB BLD-MCNC: 10.9 G/DL (ref 12–16)
INR PPP: 1.4 (ref 0.8–1.2)
LYMPHOCYTES # BLD: 1.2 K/UL (ref 0.9–3.6)
LYMPHOCYTES NFR BLD: 22 % (ref 21–52)
MCH RBC QN AUTO: 30.1 PG (ref 24–34)
MCHC RBC AUTO-ENTMCNC: 32.9 G/DL (ref 31–37)
MCV RBC AUTO: 91.4 FL (ref 74–97)
MONOCYTES # BLD: 0.6 K/UL (ref 0.05–1.2)
MONOCYTES NFR BLD: 12 % (ref 3–10)
NEUTS SEG # BLD: 3.4 K/UL (ref 1.8–8)
NEUTS SEG NFR BLD: 62 % (ref 40–73)
PLATELET # BLD AUTO: 118 K/UL (ref 135–420)
PMV BLD AUTO: 8.9 FL (ref 9.2–11.8)
POTASSIUM SERPL-SCNC: 4 MMOL/L (ref 3.5–5.5)
PROT SERPL-MCNC: 6.6 G/DL (ref 6.4–8.2)
PROTHROMBIN TIME: 16.7 SEC (ref 11.5–15.2)
RBC # BLD AUTO: 3.62 M/UL (ref 4.2–5.3)
SODIUM SERPL-SCNC: 136 MMOL/L (ref 136–145)
WBC # BLD AUTO: 5.5 K/UL (ref 4.6–13.2)

## 2019-05-30 PROCEDURE — 85025 COMPLETE CBC W/AUTO DIFF WBC: CPT

## 2019-05-30 PROCEDURE — 80076 HEPATIC FUNCTION PANEL: CPT

## 2019-05-30 PROCEDURE — 36415 COLL VENOUS BLD VENIPUNCTURE: CPT

## 2019-05-30 PROCEDURE — 85610 PROTHROMBIN TIME: CPT

## 2019-05-30 PROCEDURE — 80048 BASIC METABOLIC PNL TOTAL CA: CPT

## 2019-05-30 NOTE — PROGRESS NOTES
1. Have you been to the ER, urgent care clinic since your last visit? Hospitalized since your last visit? No    2. Have you seen or consulted any other health care providers outside of the [address] [place] [address] since your last visit? Include any pap smears or colon screening.  Yes

## 2019-05-30 NOTE — PROGRESS NOTES
Discussed increasing diuretics to step 4. Will repeat lab work when patient comes back for EGD on 6/10/19. Patient expressed understanding of directions.

## 2019-05-30 NOTE — PROGRESS NOTES
[address] [place] [address], MD, FACP, [place], [place]      THAD Danielle, [facility]     April Deepa Teresa, NP   Lan Burton, NICKY Bishop, NICKY Fontana [address]    at [facility]    [address] [facility], [address] [place] [address], [facility] 22.    [phone]    FAX: [address] [place] [address]    at [address], [address]    [phone]    FAX: [phone]       Patient Care Team:  Rita Cooney MD as PCP - General (Family Practice)      Problem List  Date Reviewed: 6/1/2019          Codes Class Noted    Cirrhosis ([facility] ICD-10-CM: K74.60  ICD-9-CM: 571.5  3/31/2019        COPD (chronic obstructive pulmonary disease) ([facility] ICD-10-CM: J44.9  ICD-9-CM: 496  3/31/2019        Hypertension ICD-10-CM: I10  ICD-9-CM: 401.9  3/31/2019        Hyperlipidemia ICD-10-CM: E78.5  ICD-9-CM: 272.4  3/31/2019        Type 2 diabetes mellitus without complication ([facility] TIB-08-NC: E11.9  ICD-9-CM: 250.00  3/31/2019        GERD (gastroesophageal reflux disease) ICD-10-CM: K21.9  ICD-9-CM: 530.81  3/31/2019        Asthma ICD-10-CM: J45.909  ICD-9-CM: 493.90  3/31/2019        Thrombocytopenia ([facility] ICD-10-CM: D69.6  ICD-9-CM: 287.5  3/31/2019        Anasarca ICD-10-CM: R60.1  ICD-9-CM: 782.3  3/31/2019        Obesity, morbid ([facility] ICD-10-CM: E66.01  ICD-9-CM: 278.01  3/27/2019              Liliana Kincaid returns to the [address] for management of cirrhosis of undefined etiology. The active problem list, all pertinent past medical history, medications, radiologic findings and laboratory findings related to the liver disorder were reviewed with the patient. The patient is a 76 y.o.   female who was found to have chronic liver disease and cirrhosis in 3/2019 when she developed weight gain and anasarca. The most recent imaging of the liver was Ultrasound performed in 4/2019. Results suggest cirrhosis. No liver mass lesions noted. Serologic evaluation for markers of chronic liver disease was positive for CRYSTAL, ASMA. The patient has developed the following complications of cirrhosis: edema. Since the last office appointment the patient has: The patient has had a marked improvement in edema and 50 pound weight loss with diuretics. The patient notes fatigue, swelling of the abdomen has resolved, swelling of the lower extremities has nearly resolved,     The patient has not experienced pain in the right side over the liver, yellowing of the eyes or skin,      The patient completes all daily activities without any functional limitations. ASSESSMENT AND PLAN:  Cirrhosis  Cirrhosis of unclear etiology. The diagnosis of cirrhosis is based upon imaging, laboratory studies, complications of cirrhosis. The CTP is 10. Child class C. The MELD score is 17. Have performed laboratory testing to monitor liver function and degree of liver injury. This included BMP, hepatic panel, CBC with platelet count, INR. AST is elevated. ALT is normal.  ALP is elevated. Liver function is depressed. The platelet count is depressed. Autoimmune Hepatitis   The diagnosis is supspected was based upon serology of CRYSTAL and ASMA  Histologic confirmation has not been performed. The patient is not currently receiving treatment for the liver disease. AST is elevated. ALT is normal.  ALP is elevated. Liver function is depressed. Total bilirubin is elevated. Serum albumin is depressed. INR is prolonged. The platelet count is depressed. Given her body weight and decompensated cirrhosis there is no plan to treat with immune suppression. There is therefore no reason to perform liver biopsy at this time.     Elastography performed in 4/2019 suggests stage stage 1 fibrosis. This is likely inaccurate because of her obesity and anasarca at the time of the exam.  Will consider repeat elastography after resolution of all anasarca. Ascites   Ascites has resolved with current dose of diuretics. The patient was counseled regarding the need to maintain sodium restriction and the types of foods containing high amounts of sodium to be avoided. Lower extremity edema  Edema is greatly improved on step 3 diuretics. She has lost about 50 pounds since the initial appointment 4 weeks ago. There is still mild edema. Will continue the current dose of diuretics at step 3. The renal function is normal and edema should be able to be controled with diuretics. Screening for Esophageal varices   The patient has not had an EGD to screen for varices. She is scheduled for EGD to assess for varices and need for banding 6/2019. Hepatic encephalopathy   HE has not developed to date. There is no need for treatment with lactulose and/or Xifaxan at this time. There is no need to restrict dietary protein at this time. Thrombocytopenia   This is secondary to cirrhosis. There is no evidence of overt bleeding. No treatment is required. Anemia   This is due to portal hypertension with chronic GI blood loss  The serum ferritin is depressed  The FE saturation is depressed  Will administer oral iron supplements. If the patient does not respond to oral FE will switch to intravenous iron. Will schedule for EGD to assess for UGI blood loss. Screening for Hepatocellular Carcinoma  HCC screening has recently been performed and does not suggest [address] The next liver imaging study will be performed in 9/2019. Treatment of other medical problems in patients with chronic liver disease  There are no contraindications for the patient to take most medications that are necessary for treatment of other medical issues.     The patient has cirrhrosis and should avoid the following medications:  NSAIDs which are associated with a higher rate of developing ROSELINE. The patient can take the following medications as these will not impact the patient's current liver disease. Any medications utilized for treatment of DM  Statins to treat hypercholesterolemia    The patient does not consume alcohol on a daily basis. Normal doses of acetaminophen, as recommended on the label of the bottle, are not hepatotoxic except in the setting of daily alcohol use, even in patients with cirrhosis and can be utilized for pain. Counseling for alcohol in patients with chronic liver disease  The patient has cirrhosis and was advised to be abstinent from all alcohol including non-alcoholic beer which does contain some alcohol. The patient has not consumed alcohol since 2001. Osteoporosis  The risk of osteoporosis is increased in patients with cirrhosis. DEXA bone density to assess for osteoporosis has not been performed. This should be ordered by the patients primary care physician. Vaccinations   Vaccination for viral hepatitis B is recommended since the patient has no serologic evidence of previous exposure or vaccination with immunity. Vaccination for viral hepatitis A is not needed. The patient has serologic evidence of prior exposure or vaccination with immunity. Routine vaccinations against other bacterial and viral agents can be performed as indicated. Annual flu vaccination should be administered if indicated. ALLERGIES  Allergies   Allergen Reactions    Benadryl [Diphenhydramine Hcl] Anaphylaxis    Ibuprofen Anaphylaxis    Bactrim [Sulfamethoprim] Rash    Penicillins Angioedema       MEDICATIONS  Current Outpatient Medications   Medication Sig    albuterol (PROVENTIL HFA, VENTOLIN HFA, PROAIR HFA) 90 mcg/actuation inhaler Take  by inhalation.  allopurinol (ZYLOPRIM) 100 mg tablet Take  by mouth daily.     aspirin 81 mg chewable tablet Take 81 mg by mouth daily.  cyclobenzaprine (FLEXERIL) 10 mg tablet Take  by mouth three (3) times daily as needed for Muscle Spasm(s).  diclofenac (VOLTAREN) 1 % gel Apply  to affected area four (4) times daily.  fluticasone propionate (FLOVENT DISKUS) 50 mcg/actuation inhaler Take  by inhalation.  fluticasone propion-salmeterol (ADVAIR) 500-50 mcg/dose diskus inhaler Take 1 Puff by inhalation every twelve (12) hours.  furosemide (LASIX) 40 mg tablet Take  by mouth daily.  gabapentin (NEURONTIN) 300 mg capsule Take 300 mg by mouth three (3) times daily.  iron polysaccharides (NIFEREX) 150 mg iron capsule Take 150 mg by mouth two (2) times a day.  lancets misc by Does Not Apply route.  lisinopril (PRINIVIL, ZESTRIL) 10 mg tablet Take  by mouth daily.  metFORMIN (GLUCOPHAGE) 500 mg tablet Take  by mouth two (2) times daily (with meals).  omeprazole (PRILOSEC) 40 mg capsule Take 40 mg by mouth daily.  spironolactone (ALDACTONE) 100 mg tablet Take 3 Tabs by mouth daily. Indications: Accumulation of Fluid caused by Cirrhosis of the Liver    simvastatin (ZOCOR) 40 mg tablet Take  by mouth nightly. No current facility-administered medications for this visit. SYSTEM REVIEW NOT RELATED TO LIVER DISEASE OR REVIEWED ABOVE:  Constitution systems: Negative for fever, chills,   Eyes: Negative for visual changes. ENT: Negative for sore throat, painful swallowing. Respiratory: Negative for cough, hemoptysis,   Cardiology: Negative for chest pain, palpitations. GI:  Negative for constipation or diarrhea. : Negative for urinary frequency, dysuria, hematuria, nocturia. Skin: Negative for rash. Hematology: easy bruising,   Musculo-skelatal: Negative for back pain, muscle pain, weakness. Neurologic: Negative for headaches, dizziness, vertigo, memory problems not related to HE. Psychology: Negative for anxiety, depression. FAMILY HISTORY:  The father  of unknown cause.   The mother  of cirrhosis r/t alcohol. There is no family history of immune disorders. SOCIAL HISTORY:  The patient is . The patient has 2 children, and 12 grandchildren. The patient stopped using tobacco products in . The patient has never consumed significant amounts of alcohol. The patient has been abstinent from alcohol since . The patient retired in  a  at [employer]. PHYSICAL EXAMINATION:  Visit Vitals  /53 (BP 1 Location: Right arm, BP Patient Position: Sitting)   Pulse 94   Temp 97.9 °F (36.6 °C)   Ht 5' 5\" (1.651 m)   Wt 271 lb (122.9 kg)   SpO2 98%   BMI 45.10 kg/m²     General: No acute distress. Eyes: Sclera anicteric. ENT: No oral lesions. Thyroid normal.  Nodes: No adenopathy. Skin: No spider angiomata. No jaundice. No palmar erythema. Respiratory: Lungs clear to auscultation. Cardiovascular: Regular heart rate. No murmurs. No JVD. Abdomen: Soft non-tender. Unable to palpate for liver or spleen due to body habitus. No obvious ascites  Extremities: 1+ edema   Neurologic: Alert and oriented. Cranial nerves grossly intact. No asterixis. LABORATORY STUDIES:  Liver [place] of [address] & Units 2019   WBC 4.6 - 13.2 K/uL 5.5 4.7   ANC 1.8 - 8.0 K/UL 3.4 3.0   HGB 12.0 - 16.0 g/dL 10.9 (L) 10.3 (L)    - 420 K/uL 118 (L) 105 (L)   INR 0.8 - 1.2   1.4 (H) 1.4 (H)   AST 15 - 37 U/L 98 (H) 92 (H)   ALT 13 - 56 U/L 38 39   Alk Phos 45 - 117 U/L 134 (H) 142 (H)   Bili, Total 0.2 - 1.0 MG/DL 3.4 (H) 3.2 (H)   Bili, Direct 0.0 - 0.2 MG/DL 1.8 (H) 1.7 (H)   Albumin 3.4 - 5.0 g/dL 2.6 (L) 2.7 (L)   BUN 7.0 - 18 MG/DL 15 9   Creat 0.6 - 1.3 MG/DL 1.19 0.80   Na 136 - 145 mmol/L 136 139   K 3.5 - 5.5 mmol/L 4.0 3.5   Cl 100 - 108 mmol/L 98 (L) 99 (L)   CO2 21 - 32 mmol/L 29 29   Glucose 74 - 99 mg/dL 89 78     SEROLOGIES:  3/2019.   HBsurface antigen negative, anti-HCV negative    Serologies Latest Ref Rng & Units 3/27/2019   Hep A Ab, Total NEGATIVE   Positive (A)   Hep B Core Ab, Total NEGATIVE   NEGATIVE   Hep B Surface Ab >10.0 mIU/mL <3.10 (L)   Hep B Surface Ab Interp POS   NEGATIVE (A)   Ferritin 8 - 388 NG/ML 23   Iron % Saturation % 11   CRYSTAL, IFA  Positive (A)   CRYSTAL Pattern  1:160 (H)   C-ANCA Neg:<1:20 titer <1:20   P-ANCA Neg:<1:20 titer <1:20   ANCA Neg:<1:20 titer <1:20   ASMCA 0 - 19 Units 26 (H)   M2 Ab 0.0 - 20.0 Units <20.0   Ceruloplasmin 19.0 - 39.0 mg/dL 24.7   Alpha-1 antitrypsin level 90 - 200 mg/dL 171     LIVER HISTOLOGY:  2019. Sheer wave elastography performed at [facility]. E range: 3.16- 10.33 kPa. E mean: 6.58 kPa. E median: 7.56 kPa. E standard: 2.49 kPa. The results suggested a fibrosis level of F1.    ENDOSCOPIC PROCEDURES:  Not available or performed    RADIOLOGY:  2018. Ultrasound of liver. Echogenic consistent with cirrhosis. No liver mass lesions. No dilated bile ducts. No ascites. 3/2019. CT scan abdomen with and without IV contrast. Changes consistent with cirrhosis. No liver mass lesions. No dilated bile ducts. Mild ascites. OTHER TESTIN2018. ECHO- normal    FOLLOW-UP:  All of the issues listed above in the Assessment and Plan were discussed with the patient. All questions were answered. The patient expressed a clear understanding of the above. [address] [address] in 4 weeks for monitoring of cirrhosis.       BEKAH [facility]. Ricardo Hardin 144 Liver [place] of [address] [place] [address] Road,[address]  [address], [facility] [address]  [facility], [address] [phone]

## 2019-06-01 PROBLEM — K74.60 CIRRHOSIS (HCC): Status: ACTIVE | Noted: 2019-03-31

## 2019-06-10 ENCOUNTER — HOSPITAL ENCOUNTER (OUTPATIENT)
Age: 69
Setting detail: OUTPATIENT SURGERY
Discharge: HOME | End: 2019-06-10
Attending: INTERNAL MEDICINE | Admitting: INTERNAL MEDICINE
Payer: COMMERCIAL

## 2019-06-10 VITALS
HEART RATE: 95 BPM | DIASTOLIC BLOOD PRESSURE: 34 MMHG | OXYGEN SATURATION: 96 % | BODY MASS INDEX: 43.99 KG/M2 | WEIGHT: 264 LBS | TEMPERATURE: 97.3 F | RESPIRATION RATE: 16 BRPM | HEIGHT: 65 IN | SYSTOLIC BLOOD PRESSURE: 94 MMHG

## 2019-06-10 LAB — GLUCOSE BLD STRIP.AUTO-MCNC: 93 MG/DL (ref 70–110)

## 2019-06-10 PROCEDURE — 88342 IMHCHEM/IMCYTCHM 1ST ANTB: CPT

## 2019-06-10 PROCEDURE — 76040000019: Performed by: INTERNAL MEDICINE

## 2019-06-10 PROCEDURE — 82962 GLUCOSE BLOOD TEST: CPT

## 2019-06-10 PROCEDURE — 74011250636 HC RX REV CODE- 250/636

## 2019-06-10 PROCEDURE — G0500 MOD SEDAT ENDO SERVICE >5YRS: HCPCS | Performed by: INTERNAL MEDICINE

## 2019-06-10 PROCEDURE — 77030021593 HC FCPS BIOP ENDOSC BSC -A: Performed by: INTERNAL MEDICINE

## 2019-06-10 PROCEDURE — 74011250636 HC RX REV CODE- 250/636: Performed by: INTERNAL MEDICINE

## 2019-06-10 PROCEDURE — 88305 TISSUE EXAM BY PATHOLOGIST: CPT

## 2019-06-10 RX ORDER — EPINEPHRINE 0.1 MG/ML
1 INJECTION INTRACARDIAC; INTRAVENOUS
Status: CANCELLED | OUTPATIENT
Start: 2019-06-10 | End: 2019-06-11

## 2019-06-10 RX ORDER — ATROPINE SULFATE 0.1 MG/ML
0.5 INJECTION INTRAVENOUS
Status: CANCELLED | OUTPATIENT
Start: 2019-06-10 | End: 2019-06-11

## 2019-06-10 RX ORDER — SODIUM CHLORIDE 0.9 % (FLUSH) 0.9 %
5-40 SYRINGE (ML) INJECTION AS NEEDED
Status: CANCELLED | OUTPATIENT
Start: 2019-06-10

## 2019-06-10 RX ORDER — MIDAZOLAM HYDROCHLORIDE 1 MG/ML
.25-5 INJECTION, SOLUTION INTRAMUSCULAR; INTRAVENOUS
Status: DISCONTINUED | OUTPATIENT
Start: 2019-06-10 | End: 2019-06-10 | Stop reason: HOSPADM

## 2019-06-10 RX ORDER — SODIUM CHLORIDE 0.9 % (FLUSH) 0.9 %
5-40 SYRINGE (ML) INJECTION EVERY 8 HOURS
Status: CANCELLED | OUTPATIENT
Start: 2019-06-10

## 2019-06-10 RX ORDER — FENTANYL CITRATE 50 UG/ML
100 INJECTION, SOLUTION INTRAMUSCULAR; INTRAVENOUS
Status: DISCONTINUED | OUTPATIENT
Start: 2019-06-10 | End: 2019-06-10 | Stop reason: HOSPADM

## 2019-06-10 RX ORDER — NALOXONE HYDROCHLORIDE 0.4 MG/ML
0.4 INJECTION, SOLUTION INTRAMUSCULAR; INTRAVENOUS; SUBCUTANEOUS
Status: DISCONTINUED | OUTPATIENT
Start: 2019-06-10 | End: 2019-06-10 | Stop reason: HOSPADM

## 2019-06-10 RX ORDER — FLUMAZENIL 0.1 MG/ML
0.2 INJECTION INTRAVENOUS
Status: DISCONTINUED | OUTPATIENT
Start: 2019-06-10 | End: 2019-06-10 | Stop reason: HOSPADM

## 2019-06-10 RX ORDER — SODIUM CHLORIDE 9 MG/ML
100 INJECTION, SOLUTION INTRAVENOUS CONTINUOUS
Status: DISCONTINUED | OUTPATIENT
Start: 2019-06-10 | End: 2019-06-10 | Stop reason: HOSPADM

## 2019-06-10 RX ORDER — DEXTROMETHORPHAN/PSEUDOEPHED 2.5-7.5/.8
1.2 DROPS ORAL
Status: CANCELLED | OUTPATIENT
Start: 2019-06-10

## 2019-06-10 RX ADMIN — SODIUM CHLORIDE 100 ML/HR: 900 INJECTION, SOLUTION INTRAVENOUS at 08:45

## 2019-06-10 NOTE — H&P
[address] [facility]      Yael Mills MD, MD Stevenson Hurley PA-C Lee President, [facility]     April Artis Coffey, NICKY Sanchez, NICKY Rosenberg, NICKY Fontana [facility] 136    at [facility]    [address] [facility], [address] [place] [address], [facility] 22.    [phone]    FAX: [address] [place] [address], [address]    [phone]    FAX: [phone]         PRE-PROCEDURE NOTE - EGD    H and P from last office visit reviewed. Allergies reviewed. Out-patient medicaton list reviewed. Patient Active Problem List   Diagnosis Code    Obesity, morbid ([facility]) E66.01    Cirrhosis ([facility] K74.60    COPD (chronic obstructive pulmonary disease) ([facility]) J44.9    Hypertension I10    Hyperlipidemia E78.5    Type 2 diabetes mellitus without complication ([facility]) M74.4    GERD (gastroesophageal reflux disease) K21.9    Asthma J45.909    Thrombocytopenia ([facility]) D69.6    Anasarca R60.1       Allergies   Allergen Reactions    Benadryl [Diphenhydramine Hcl] Anaphylaxis    Ibuprofen Anaphylaxis    Bactrim [Sulfamethoprim] Rash    Penicillins Angioedema       No current facility-administered medications on file prior to encounter. Current Outpatient Medications on File Prior to Encounter   Medication Sig Dispense Refill    allopurinol (ZYLOPRIM) 100 mg tablet Take  by mouth daily.  furosemide (LASIX) 40 mg tablet Take  by mouth daily.  gabapentin (NEURONTIN) 300 mg capsule Take 300 mg by mouth three (3) times daily.  lancets misc by Does Not Apply route.  lisinopril (PRINIVIL, ZESTRIL) 10 mg tablet Take  by mouth daily.  metFORMIN (GLUCOPHAGE) 500 mg tablet Take  by mouth two (2) times daily (with meals).       simvastatin (ZOCOR) 40 mg tablet Take  by mouth nightly.  spironolactone (ALDACTONE) 100 mg tablet Take 3 Tabs by mouth daily. Indications: Accumulation of Fluid caused by Cirrhosis of the Liver 90 Tab 3    albuterol (PROVENTIL HFA, VENTOLIN HFA, PROAIR HFA) 90 mcg/actuation inhaler Take  by inhalation.  cyclobenzaprine (FLEXERIL) 10 mg tablet Take  by mouth three (3) times daily as needed for Muscle Spasm(s).  fluticasone propionate (FLOVENT DISKUS) 50 mcg/actuation inhaler Take  by inhalation.  fluticasone propion-salmeterol (ADVAIR) 500-50 mcg/dose diskus inhaler Take 1 Puff by inhalation every twelve (12) hours. For EGD to assess for esophageal and gastric varices. Plan to perform banding if indicated based upon variceal size and appearance. The risks of the procedure were discussed with the patient. These included reaction to anesthesia, pain, perforation and bleeding. All questions were answered. The patient wishes to proceed with the procedure. PHYSICAL EXAMINATION:  VS: per nursing note  General: No acute distress. Eyes: Sclera anicteric. ENT: No oral lesions. Thyroid normal.  Nodes: No adenopathy. Skin: No spider angiomata. No jaundice. No palmar erythema. Respiratory: Lungs clear to auscultation. Cardiovascular: Regular heart rate. No murmurs. No JVD. Abdomen: Soft non-tender, liver size normal to percussion/palpation. Spleen not palpable. No obvious ascites. Extremities: No edema. No muscle wasting. No gross arthritic changes. Neurologic: Alert and oriented. Cranial nerves grossly intact. No asterixis.       MOST RECENT LABORATORY STUDIES:  Lab Results   Component Value Date/Time    WBC 5.5 05/30/2019 10:11 AM    HGB 10.9 (L) 05/30/2019 10:11 AM    HCT 33.1 (L) 05/30/2019 10:11 AM    PLATELET 022 (L) 14/62/3596 10:11 AM    MCV 91.4 05/30/2019 10:11 AM     Lab Results   Component Value Date/Time    INR 1.4 (H) 05/30/2019 10:11 AM    INR 1.4 (H) 04/24/2019 11:26 AM    INR 1.5 (H) 04/08/2019 10:36 AM    Prothrombin time 16.7 (H) 05/30/2019 10:11 AM    Prothrombin time 17.4 (H) 04/24/2019 11:26 AM    Prothrombin time 18.0 (H) 04/08/2019 10:36 AM       ASSESSMENT AND PLAN:  EGD to assess for esophageal and/or gastric varices. Conscious sedation with fentanyl and versed.       Alfa Damon MD  [address]  [address] [place] [address], [address]  12 [facility]

## 2019-06-10 NOTE — DISCHARGE INSTRUCTIONS
[address] [facility]      Sharri Tabares MD, MD Leonela Hernandez PA-C Elner Perks, [facility]-BC     April Evangelina Decker, NICKY Norris, NICKY [facility] 136    at [facility]    [address] [employer], [address] [place] [address], [facility] 22.    [phone]    FAX: [address] [place] [address], [address]    [phone]    FAX: [phone]         ENDOSCOPY DISCHARGE INSTRUCTIONS      Kendall Lozano  1950  Date: 6/10/2019    DISCOMFORT:  Use lozenges or warm salt water gargle for sore thoat  Apply warm compress to IV site if red. If redness or soreness persists call the office. You may experience gas and bloating. Walking and belching will help relieve this. You may experience chest discomfort or pressure especially if banding of esophageal varices was performed. DIET:  You may resume your normal diet. ACTIVITY:  Spend the remainder of the day resting. Avoid any strenuous activity. You may not operate a vehicle for 12 hours. You may not engage in an occupation involving machinery or appliances for rest of today. Avoid making any critical or financial decisions for at least 24 hour. Call the [address] [address] [employer] if you have any of the following:  Increasing chest or abdominal pain, nausea, vomiting, vomiting blood, abdominal distension or swelling, fever or chills, bloody discharge from nose or mouth or shortness of breath. Follow-up Instructions:  Call Dr. Zelalem Crowley for any questions or problems at the phone number listed above. If a biopsy was performed, you will be contacted by the office staff or Dr Zelalem Crowley within 1 week.    If you have not heard from us by then you may call the office at the phone number listed above to inquire about the results. ENDOSCOPY FINDINGS:  Your endoscopy demonstrated mild swelling of the stomach. A biopsy of the stomach was taken. DISCHARGE SUMMARY from the Nurse: The following personal items collected during your admission are returned to you:   Dental Appliance: Dental Appliances: Uppers  Vision: Visual Aid: None  Hearing Aid:    Jewelry:    Clothing:    Other Valuables:    Valuables sent to safe:        DISCHARGE SUMMARY from Nurse    The following personal items collected during your admission are returned to you:   Dental Appliance: Dental Appliances: Uppers  Vision: Visual Aid: None  Hearing Aid:    Jewelry:    Clothing:    Other Valuables:    Valuables sent to safe:              PATIENT INSTRUCTIONS:    After general anesthesia or intravenous sedation, for 24 hours or while taking prescription Narcotics:  · Limit your activities  · Do not drive and operate hazardous machinery  · Do not make important personal or business decisions  · Do  not drink alcoholic beverages  · If you have not urinated within 8 hours after discharge, please contact your surgeon on call. Report the following to your surgeon:  · Excessive pain, swelling, redness or odor of or around the surgical area  · Temperature over 100.5  · Nausea and vomiting lasting longer than 4 hours or if unable to take medications  · Any signs of decreased circulation or nerve impairment to extremity: change in color, persistent  numbness, tingling, coldness or increase pain  · Any questions      Follow-up Appointments   Procedures    FOLLOW UP VISIT Appointment in: Other (Specify) AS scheduled     AS scheduled     Standing Status:   Standing     Number of Occurrences:   1     Order Specific Question:   Appointment in     Answer:    Other (Specify)       What to do at Home:  Recommended activity: as above,     If you experience any of the following symptoms as above, please follow up with  David. *  Please give a list of your current medications to your Primary Care Provider. *  Please update this list whenever your medications are discontinued, doses are      changed, or new medications (including over-the-counter products) are added. *  Please carry medication information at all times in case of emergency situations. These are general instructions for a healthy lifestyle:    No smoking/ No tobacco products/ Avoid exposure to second hand smoke    Surgeon General's Warning:  Quitting smoking now greatly reduces serious risk to your health. Obesity, smoking, and sedentary lifestyle greatly increases your risk for illness    A healthy diet, regular physical exercise & weight monitoring are important for maintaining a healthy lifestyle    You may be retaining fluid if you have a history of heart failure or if you experience any of the following symptoms:  Weight gain of 3 pounds or more overnight or 5 pounds in a week, increased swelling in our hands or feet or shortness of breath while lying flat in bed. Please call your doctor as soon as you notice any of these symptoms; do not wait until your next office visit. Recognize signs and symptoms of STROKE:    F-face looks uneven    A-arms unable to move or move unevenly    S-speech slurred or non-existent    T-time-call 911 as soon as signs and symptoms begin-DO NOT go       Back to bed or wait to see if you get better-TIME IS BRAIN. The discharge information has been reviewed with the patient and spouse. The patient and spouse verbalized understanding. Warning Signs of HEART ATTACK     Call 911 if you have these symptoms:   Chest discomfort. Most heart attacks involve discomfort in the center of the chest that lasts more than a few minutes, or that goes away and comes back. It can feel like uncomfortable pressure, squeezing, fullness, or pain.  Discomfort in other areas of the upper body.  Symptoms can include pain or discomfort in one or both arms, the back, neck, jaw, or stomach.  Shortness of breath with or without chest discomfort.  Other signs may include breaking out in a cold sweat, nausea, or lightheadedness. Don't wait more than five minutes to call 911 - MINUTES MATTER! Fast action can save your life. Calling 911 is almost always the fastest way to get lifesaving treatment. Emergency Medical Services staff can begin treatment when they arrive -- up to an hour sooner than if someone gets to the hospital by car. The discharge information has been reviewed with the patient and caregiver. The patient and caregiver verbalized understanding. Discharge medications reviewed with the patient and guardian and appropriate educational materials and side effects teaching were provided.     Patient armband removed and shredded

## 2019-06-10 NOTE — PROCEDURES
[address] [facility]      Ruthann Hernandez MD, MD Juliana Johnson, THAD Abreu, [facility]-BC     April Deepa Teresa, NICKY Burton, NICKY Bishop, NICKY Fontana [facility] 136    at [address]    217 [facility], [address] [place] [address], [facility] 22.    [phone]    FAX: [address] [place] [address], [address] [place] [address], [address]    [phone]    FAX: [phone]         UPPER ENDOSCOPY PROCEDURE NOTE    [facility]  1950    INDICATION: Cirrhosis. Screening for esophageal varices with variceal ligation if  indicated. : Ruthann Hernandez MD    ANESTHESIA/SEDATION: Versed 5 mg and Fentanyl 100 mcg      PROCEDURE DESCRIPTION:  [employer] consent was obtained from the patient for the procedure. All risks and benefits of the procedure explained. The patient was taken to the endoscopy suite and placed in the left lateral decubitus position. Following sequential administration of sedation to doses as indicated above the endoscope was inserted into the mouth and advanced under direct vision to the second portion of the duodenum. Careful inspection of upper gastrointestinal tract was made as the endoscope was inserted and withdrawn. Retroflexion of the endoscope to view of the cardia of the stomach was performed. The findings and interventions are described below. FINDINGS:  Esophagus:    Small esophageal varices. No banding performed. Stomach:   Mild portal hypertensive gastropathy of the body of the stomach  Small Erosions located in the antrum  Gastritis of the antrum  No gastric varices identified. Duodenum:   Normal bulb and second portion    INTERVENTION:   2 biopsies were taken from the antrum.   The specimens were placed in preservative and transported to Pathology after the procedure. COMPLICATIONS: None. The patient tolerated the procedure well. EBL: Negligible. RECOMMENDATIONS:  Observe until discharge parameters are achieved. May resume previous diet. Repeat endoscopy to reassess varices and need for banding in 1 year. Follow-up [facility] [place] [facility] [place] office as scheduled.       Chilango Brasher MD  [address]  4 [facility], [address] [place] [address], [address] [place] [address] Street [address]  [address] [place] [address]

## 2019-07-16 ENCOUNTER — HOSPITAL ENCOUNTER (OUTPATIENT)
Dept: LAB | Age: 69
Discharge: HOME | End: 2019-07-16
Payer: COMMERCIAL

## 2019-07-16 ENCOUNTER — OFFICE VISIT (OUTPATIENT)
Dept: HEMATOLOGY | Age: 69
End: 2019-07-16

## 2019-07-16 VITALS
DIASTOLIC BLOOD PRESSURE: 60 MMHG | HEIGHT: 65 IN | WEIGHT: 291 LBS | OXYGEN SATURATION: 98 % | BODY MASS INDEX: 48.48 KG/M2 | SYSTOLIC BLOOD PRESSURE: 136 MMHG | HEART RATE: 92 BPM | TEMPERATURE: 96.1 F

## 2019-07-16 DIAGNOSIS — K74.60 CIRRHOSIS OF LIVER WITH ASCITES, UNSPECIFIED HEPATIC CIRRHOSIS TYPE (HCC): Primary | ICD-10-CM

## 2019-07-16 DIAGNOSIS — R18.8 CIRRHOSIS OF LIVER WITH ASCITES, UNSPECIFIED HEPATIC CIRRHOSIS TYPE (HCC): ICD-10-CM

## 2019-07-16 DIAGNOSIS — R18.8 CIRRHOSIS OF LIVER WITH ASCITES, UNSPECIFIED HEPATIC CIRRHOSIS TYPE (HCC): Primary | ICD-10-CM

## 2019-07-16 DIAGNOSIS — K74.60 CIRRHOSIS OF LIVER WITH ASCITES, UNSPECIFIED HEPATIC CIRRHOSIS TYPE (HCC): ICD-10-CM

## 2019-07-16 LAB
ALBUMIN SERPL-MCNC: 2.8 G/DL (ref 3.4–5)
ALBUMIN/GLOB SERPL: 0.8 {RATIO} (ref 0.8–1.7)
ALP SERPL-CCNC: 202 U/L (ref 45–117)
ALT SERPL-CCNC: 43 U/L (ref 13–56)
AMMONIA PLAS-SCNC: 98 UMOL/L (ref 11–32)
ANION GAP SERPL CALC-SCNC: 6 MMOL/L (ref 3–18)
AST SERPL-CCNC: 97 U/L (ref 15–37)
BASOPHILS # BLD: 0 K/UL (ref 0–0.1)
BASOPHILS NFR BLD: 1 % (ref 0–2)
BILIRUB DIRECT SERPL-MCNC: 2.7 MG/DL (ref 0–0.2)
BILIRUB SERPL-MCNC: 5.1 MG/DL (ref 0.2–1)
BUN SERPL-MCNC: 10 MG/DL (ref 7–18)
BUN/CREAT SERPL: 11 (ref 12–20)
CALCIUM SERPL-MCNC: 9 MG/DL (ref 8.5–10.1)
CHLORIDE SERPL-SCNC: 100 MMOL/L (ref 100–108)
CO2 SERPL-SCNC: 31 MMOL/L (ref 21–32)
CREAT SERPL-MCNC: 0.93 MG/DL (ref 0.6–1.3)
DIFFERENTIAL METHOD BLD: ABNORMAL
EOSINOPHIL # BLD: 0.2 K/UL (ref 0–0.4)
EOSINOPHIL NFR BLD: 3 % (ref 0–5)
ERYTHROCYTE [DISTWIDTH] IN BLOOD BY AUTOMATED COUNT: 20.7 % (ref 11.6–14.5)
GLOBULIN SER CALC-MCNC: 3.6 G/DL (ref 2–4)
GLUCOSE SERPL-MCNC: 86 MG/DL (ref 74–99)
HCT VFR BLD AUTO: 27.5 % (ref 35–45)
HGB BLD-MCNC: 9.1 G/DL (ref 12–16)
INR PPP: 1.6 (ref 0.8–1.2)
LYMPHOCYTES # BLD: 1.1 K/UL (ref 0.9–3.6)
LYMPHOCYTES NFR BLD: 20 % (ref 21–52)
MCH RBC QN AUTO: 33.7 PG (ref 24–34)
MCHC RBC AUTO-ENTMCNC: 33.1 G/DL (ref 31–37)
MCV RBC AUTO: 101.9 FL (ref 74–97)
MONOCYTES # BLD: 0.6 K/UL (ref 0.05–1.2)
MONOCYTES NFR BLD: 12 % (ref 3–10)
NEUTS SEG # BLD: 3.4 K/UL (ref 1.8–8)
NEUTS SEG NFR BLD: 64 % (ref 40–73)
PLATELET # BLD AUTO: 93 K/UL (ref 135–420)
PMV BLD AUTO: 9.2 FL (ref 9.2–11.8)
POTASSIUM SERPL-SCNC: 3.4 MMOL/L (ref 3.5–5.5)
PROT SERPL-MCNC: 6.4 G/DL (ref 6.4–8.2)
PROTHROMBIN TIME: 19.2 SEC (ref 11.5–15.2)
RBC # BLD AUTO: 2.7 M/UL (ref 4.2–5.3)
SODIUM SERPL-SCNC: 137 MMOL/L (ref 136–145)
WBC # BLD AUTO: 5.3 K/UL (ref 4.6–13.2)

## 2019-07-16 PROCEDURE — 80048 BASIC METABOLIC PNL TOTAL CA: CPT

## 2019-07-16 PROCEDURE — 85025 COMPLETE CBC W/AUTO DIFF WBC: CPT

## 2019-07-16 PROCEDURE — 85610 PROTHROMBIN TIME: CPT

## 2019-07-16 PROCEDURE — 80076 HEPATIC FUNCTION PANEL: CPT

## 2019-07-16 PROCEDURE — 82140 ASSAY OF AMMONIA: CPT

## 2019-07-16 PROCEDURE — 36415 COLL VENOUS BLD VENIPUNCTURE: CPT

## 2019-07-16 RX ORDER — LACTULOSE 10 G/15ML
SOLUTION ORAL; RECTAL 2 TIMES DAILY
COMMUNITY

## 2019-07-16 RX ORDER — TRAMADOL HYDROCHLORIDE 50 MG/1
50 TABLET ORAL
COMMUNITY

## 2019-07-16 RX ORDER — IRON POLYSACCHARIDE COMPLEX 150 MG
150 CAPSULE ORAL 2 TIMES DAILY
COMMUNITY

## 2019-07-16 RX ORDER — OMEPRAZOLE 40 MG/1
40 CAPSULE, DELAYED RELEASE ORAL DAILY
COMMUNITY

## 2019-07-16 NOTE — PROGRESS NOTES
[facility] [place], MD, MD Ramy Alejandro, PA-C    Rishi Cutler, [facility]-BC     Renae Estes, [facility]   Vladimir Barker FNP-BRENDA Watson, [facility]       Titi Fontana [facility], [facility] 136    at [address], [address] [place] [address], [facility] 22.    [phone]    FAX: [address] [place] [address] Avenue    [facility]    [address], [address]    [address] [place] [address], [address]    [phone]    FAX: [phone]       Patient Care Team:  Gustavo Alvarado MD as PCP - General (Family Practice)      Problem List  Date Reviewed: 6/1/2019          Codes Class Noted    Cirrhosis ([facility] 75.) ICD-10-CM: K74.60  ICD-9-CM: 571.5  3/31/2019        COPD (chronic obstructive pulmonary disease) ([facility].) ICD-10-CM: J44.9  ICD-9-CM: 496  3/31/2019        Hypertension ICD-10-CM: I10  ICD-9-CM: 401.9  3/31/2019        Hyperlipidemia ICD-10-CM: E78.5  ICD-9-CM: 272.4  3/31/2019        Type 2 diabetes mellitus without complication ([facility].) IJE-55-KE: E11.9  ICD-9-CM: 250.00  3/31/2019        GERD (gastroesophageal reflux disease) ICD-10-CM: K21.9  ICD-9-CM: 530.81  3/31/2019        Asthma ICD-10-CM: J45.909  ICD-9-CM: 493.90  3/31/2019        Thrombocytopenia ([facility] 75.) ICD-10-CM: D69.6  ICD-9-CM: 287.5  3/31/2019        Anasarca ICD-10-CM: R60.1  ICD-9-CM: 782.3  3/31/2019        Obesity, morbid ([facility] 75.) ICD-10-CM: E66.01  ICD-9-CM: 278.01  3/27/2019              Bryon Kincaid returns to the [address] for management of cirrhosis of undefined etiology. The active problem list, all pertinent past medical history, medications, radiologic findings and laboratory findings related to the liver disorder were reviewed with the patient. The patient is a 71 y.o.   female who was found to have chronic liver disease and cirrhosis in 3/2019 when she developed weight gain and anasarca. The most recent imaging of the liver was Ultrasound performed in 4/2019. Results suggest cirrhosis. No liver mass lesions noted. Serologic evaluation for markers of chronic liver disease was positive for CRYSTAL, ASMA. The patient has developed the following complications of cirrhosis: edema. Since the last office appointment the patient:  Admitted to hospital for GI bleed, blood transfusion, hepatic encephalopathy. Comes in today with BLE pitting edema. The patient notes fatigue, swelling of the lower extremities. The patient has not experienced pain in the right side over the liver, yellowing of the eyes or skin,      The patient completes all daily activities without any functional limitations. ASSESSMENT AND PLAN:  Cirrhosis  Cirrhosis of unclear etiology. The diagnosis of cirrhosis is based upon imaging, laboratory studies, complications of cirrhosis. The CTP is 10. Child class C. The MELD score is 17. Have performed laboratory testing to monitor liver function and degree of liver injury. This included BMP, hepatic panel, CBC with platelet count, INR. AST is elevated. ALT is normal.  ALP is elevated. Liver function is depressed. The platelet count is depressed. HPylori positive  Will start patient on medications for treatment    Autoimmune Hepatitis   The diagnosis is suspected. It was based upon serology of CRYSTAL and ASMA  Histologic confirmation has not been performed. The patient is not currently receiving treatment for the liver disease. AST is elevated. ALT is normal.  ALP is elevated. Liver function is depressed. Total bilirubin is elevated. Serum albumin is depressed. INR is prolonged. The platelet count is depressed. Given her body weight and decompensated cirrhosis there is no plan to treat with immune suppression.   There is therefore no reason to perform liver biopsy at this time. Elastography performed in 4/2019 suggests stage stage 1 fibrosis. This is likely inaccurate because of her obesity and anasarca at the time of the exam.  Will consider repeat elastography after resolution of all anasarca. Ascites   Ascites had improved with diuretics. The patient was counseled regarding the need to maintain sodium restriction and the types of foods containing high amounts of sodium to be avoided. Lower extremity edema  Edema has returned due to hospitalization and discontinuation of diuretics. Will restart diuretics at step 3. The renal function is normal and edema should be able to be controlled with diuretics. Screening for Esophageal varices   The last EGD to assess for varices was performed in 6/2019. Small esophageal varices noted. No banding. Repeat endoscopy to reassess varices and need for banding in 1 year. Hepatic encephalopathy   This has recently developed. Treatment with lactulose and Xifaxan were started while the patient was in the hospital.  We will continue treatment with lactulose and Xifaxan at this time. The patient was instructed not to operate a motor vehicle because of HE which poses an increased risk for MVA. Thrombocytopenia   This is secondary to cirrhosis. There is no evidence of overt bleeding. No treatment is required. Anemia   This is due to portal hypertension with chronic GI blood loss  The serum ferritin is depressed  The FE saturation is depressed  Will administer oral iron supplements. If the patient does not respond to oral FE will switch to intravenous iron. Screening for Hepatocellular Carcinoma  HCC screening has recently been performed and does not suggest [address] The next liver imaging study will be performed in 9/2019.     Treatment of other medical problems in patients with chronic liver disease  There are no contraindications for the patient to take most medications that are necessary for treatment of other medical issues. The patient has cirrhrosis and should avoid the following medications:  NSAIDs which are associated with a higher rate of developing ROSELINE. The patient can take the following medications as these will not impact the patient's current liver disease. Any medications utilized for treatment of DM  Statins to treat hypercholesterolemia    The patient does not consume alcohol on a daily basis. Normal doses of acetaminophen, as recommended on the label of the bottle, are not hepatotoxic except in the setting of daily alcohol use, even in patients with cirrhosis and can be utilized for pain. Counseling for alcohol in patients with chronic liver disease  The patient has cirrhosis and was advised to be abstinent from all alcohol including non-alcoholic beer which does contain some alcohol. The patient has not consumed alcohol since 2001. Osteoporosis  The risk of osteoporosis is increased in patients with cirrhosis. DEXA bone density to assess for osteoporosis has not been performed. This should be ordered by the patients primary care physician. Vaccinations   Vaccination for viral hepatitis B is recommended since the patient has no serologic evidence of previous exposure or vaccination with immunity. Vaccination for viral hepatitis A is not needed. The patient has serologic evidence of prior exposure or vaccination with immunity. Routine vaccinations against other bacterial and viral agents can be performed as indicated. Annual flu vaccination should be administered if indicated. ALLERGIES  Allergies   Allergen Reactions    Benadryl [Diphenhydramine Hcl] Anaphylaxis    Ibuprofen Anaphylaxis    Bactrim [Sulfamethoprim] Rash    Penicillins Angioedema       MEDICATIONS  Current Outpatient Medications   Medication Sig    lactulose (CHRONULAC) 10 gram/15 mL solution Take  by mouth two (2) times a day.     omeprazole (PRILOSEC) 40 mg capsule Take 40 mg by mouth daily.  rifAXIMin (XIFAXAN) 550 mg tablet Take 550 mg by mouth two (2) times a day.  traMADol (ULTRAM) 50 mg tablet Take 50 mg by mouth every six (6) hours as needed for Pain.  iron polysaccharides (NIFEREX) 150 mg iron capsule Take 150 mg by mouth two (2) times a day.  albuterol (PROVENTIL HFA, VENTOLIN HFA, PROAIR HFA) 90 mcg/actuation inhaler Take  by inhalation.  allopurinol (ZYLOPRIM) 100 mg tablet Take  by mouth daily.  cyclobenzaprine (FLEXERIL) 10 mg tablet Take  by mouth three (3) times daily as needed for Muscle Spasm(s).  fluticasone propionate (FLOVENT DISKUS) 50 mcg/actuation inhaler Take  by inhalation.  fluticasone propion-salmeterol (ADVAIR) 500-50 mcg/dose diskus inhaler Take 1 Puff by inhalation every twelve (12) hours.  furosemide (LASIX) 40 mg tablet Take  by mouth daily.  gabapentin (NEURONTIN) 300 mg capsule Take 300 mg by mouth three (3) times daily.  lancets misc by Does Not Apply route.  lisinopril (PRINIVIL, ZESTRIL) 10 mg tablet Take  by mouth daily.  metFORMIN (GLUCOPHAGE) 500 mg tablet Take  by mouth two (2) times daily (with meals).  simvastatin (ZOCOR) 40 mg tablet Take  by mouth nightly.  spironolactone (ALDACTONE) 100 mg tablet Take 3 Tabs by mouth daily. Indications: Accumulation of Fluid caused by Cirrhosis of the Liver     No current facility-administered medications for this visit. SYSTEM REVIEW NOT RELATED TO LIVER DISEASE OR REVIEWED ABOVE:  Constitution systems: Negative for fever, chills,   Eyes: Negative for visual changes. ENT: Negative for sore throat, painful swallowing. Respiratory: Negative for cough, hemoptysis,   Cardiology: Negative for chest pain, palpitations. GI:  Negative for constipation or diarrhea. : Negative for urinary frequency, dysuria, hematuria, nocturia. Skin: Negative for rash.   Hematology: easy bruising,   Musculo-skelatal: Negative for back pain, muscle pain, weakness. Neurologic: Negative for headaches, dizziness, vertigo, memory problems not related to HE. Psychology: Negative for anxiety, depression. FAMILY HISTORY:  The father  of unknown cause. The mother  of cirrhosis r/t alcohol. There is no family history of immune disorders. SOCIAL HISTORY:  The patient is . The patient has 2 children, and 12 grandchildren. The patient stopped using tobacco products in . The patient has never consumed significant amounts of alcohol. The patient has been abstinent from alcohol since . The patient retired in  a  at [employer]. PHYSICAL EXAMINATION:  Visit Vitals  /60 (BP 1 Location: Right arm, BP Patient Position: Sitting)   Pulse 92   Temp 96.1 °F (35.6 °C)   Ht 5' 5\" (1.651 m)   Wt 291 lb (132 kg)   SpO2 98%   BMI 48.42 kg/m²     General: No acute distress. Eyes: Sclera anicteric. ENT: No oral lesions. Thyroid normal.  Nodes: No adenopathy. Skin: No spider angiomata. No jaundice. No palmar erythema. Respiratory: Lungs clear to auscultation. Cardiovascular: Regular heart rate. No murmurs. No JVD. Abdomen: Soft non-tender. Unable to palpate for liver or spleen due to body habitus. No obvious ascites  Extremities: 1+ edema   Neurologic: Alert and oriented. Cranial nerves grossly intact. No asterixis.     LABORATORY STUDIES:  Liver [place] [facility] [address] [place] [address] & Units 2019   WBC 4.6 - 13.2 K/uL 5.3 5.5   ANC 1.8 - 8.0 K/UL 3.4 3.4   HGB 12.0 - 16.0 g/dL 9.1 (L) 10.9 (L)    - 420 K/uL 93 (L) 118 (L)   INR 0.8 - 1.2   1.6 (H) 1.4 (H)   AST 15 - 37 U/L 97 (H) 98 (H)   ALT 13 - 56 U/L 43 38   Alk Phos 45 - 117 U/L 202 (H) 134 (H)   Bili, Total 0.2 - 1.0 MG/DL 5.1 (H) 3.4 (H)   Bili, Direct 0.0 - 0.2 MG/DL 2.7 (H) 1.8 (H)   Albumin 3.4 - 5.0 g/dL 2.8 (L) 2.6 (L)   BUN 7.0 - 18 MG/DL 10 15   Creat 0.6 - 1.3 MG/DL 0.93 1.19   Na 136 - 145 mmol/L 137 136   K 3.5 - 5.5 mmol/L 3.4 (L) 4.0   Cl 100 - 108 mmol/L 100 98 (L)   CO2 21 - 32 mmol/L 31 29   Glucose 74 - 99 mg/dL 86 89   Ammonia 11 - 32 UMOL/L 98 (H)      SEROLOGIES:  3/2019. HBsurface antigen negative, anti-HCV negative    Serologies Latest Ref Rng & Units 3/27/2019   Hep A Ab, Total NEGATIVE   Positive (A)   Hep B Core Ab, Total NEGATIVE   NEGATIVE   Hep B Surface Ab >10.0 mIU/mL <3.10 (L)   Hep B Surface Ab Interp POS   NEGATIVE (A)   Ferritin 8 - 388 NG/ML 23   Iron % Saturation % 11   CRYSTAL, IFA  Positive (A)   CRYSTAL Pattern  1:160 (H)   C-ANCA Neg:<1:20 titer <1:20   P-ANCA Neg:<1:20 titer <1:20   ANCA Neg:<1:20 titer <1:20   ASMCA 0 - 19 Units 26 (H)   M2 Ab 0.0 - 20.0 Units <20.0   Ceruloplasmin 19.0 - 39.0 mg/dL 24.7   Alpha-1 antitrypsin level 90 - 200 mg/dL 171     LIVER HISTOLOGY:  2019. Shear wave elastography performed at THE [facility]. E range:3.16- 10.33 kPa. E mean:6.58 kPa. E median:7.56 kPa. E standard: 2.49 kPa. The results suggested a fibrosis level of F1.    ENDOSCOPIC PROCEDURES:  2019. EGD performed by S. Small esophageal varices. No gastric varices. Mild portal gastropathy. Gastritis. Biopsy for HPylori was positive. 2019. EGD performed by Dr. Toro Claudio. One [Bleeding] duodenal ulcer with pigmented material.    RADIOLOGY:  2018. Ultrasound of liver. Echogenic consistent with cirrhosis. No liver mass lesions. No dilated bile ducts. No ascites. 3/2019. CT scan abdomen with and without IV contrast. Changes consistent with cirrhosis. No liver mass lesions. No dilated bile ducts. Mild ascites. OTHER TESTIN2018. ECHO- normal    FOLLOW-UP:  All of the issues listed above in the Assessment and Plan were discussed with the patient. All questions were answered. The patient expressed a clear understanding of the above. [facility] [place] [address] in 2 weeks to assess the effects and tolerability of diuretics.       Tevin Sawant, [facility]-BC  [address] [facility] [address], [address]   Marita Perry, [address]  [phone]

## 2019-07-16 NOTE — PROGRESS NOTES
1. Have you been to the ER, urgent care clinic since your last visit? Hospitalized since your last visit? Yes Jatinder Hickman    2. Have you seen or consulted any other health care providers outside of the [address] [place] [address] since your last visit? Include any pap smears or colon screening.  No

## 2019-07-17 RX ORDER — CLARITHROMYCIN 500 MG/1
500 TABLET, FILM COATED ORAL 2 TIMES DAILY
Qty: 28 TAB | Refills: 0 | Status: SHIPPED | OUTPATIENT
Start: 2019-07-17 | End: 2019-07-31

## 2019-07-17 RX ORDER — METRONIDAZOLE 500 MG/1
500 TABLET ORAL 2 TIMES DAILY
Qty: 28 TAB | Refills: 0 | Status: SHIPPED | OUTPATIENT
Start: 2019-07-17 | End: 2019-07-31

## 2019-07-19 ENCOUNTER — TELEPHONE (OUTPATIENT)
Dept: HEMATOLOGY | Age: 69
End: 2019-07-19

## 2019-07-19 NOTE — TELEPHONE ENCOUNTER
Patient's  states patient not doing well, she is complaining of being in Soosalu lot of pain\" and refusing to go the the ED. Had him put the patient on the phone and she sounded lethargic, convinced her she needed to be evaluated/treated at the nearest ED. Patient agreed and  will call 911 for transport.

## 2019-07-24 NOTE — PROGRESS NOTES
Called patient to inform her that her alk. Phosphatase and ammonia are elevated since last time and she may be feeling a little confused and/or in a cloud. Patient stated that she is feeling like she is in a cloud. Informed her that the bilirubin also went up and she may be jaundice. She said that she has been jaundice. She asked if she can take medicine that she received from the hospital for shingles. She received oxycodone, zofran, and acyclovir. Consulted provider and relayed to patient that she shouldn't take the oxycodone but can take the rest and if she is in pain she should take gabapentin. Patient verbalized understanding.

## 2019-08-05 ENCOUNTER — HOSPITAL ENCOUNTER (OUTPATIENT)
Dept: LAB | Age: 69
Discharge: HOME | End: 2019-08-05
Payer: COMMERCIAL

## 2019-08-05 ENCOUNTER — OFFICE VISIT (OUTPATIENT)
Dept: HEMATOLOGY | Age: 69
End: 2019-08-05

## 2019-08-05 VITALS
HEIGHT: 65 IN | BODY MASS INDEX: 45.98 KG/M2 | SYSTOLIC BLOOD PRESSURE: 112 MMHG | HEART RATE: 96 BPM | WEIGHT: 276 LBS | RESPIRATION RATE: 20 BRPM | DIASTOLIC BLOOD PRESSURE: 45 MMHG | TEMPERATURE: 97.6 F | OXYGEN SATURATION: 96 %

## 2019-08-05 DIAGNOSIS — K74.60 CIRRHOSIS OF LIVER WITH ASCITES, UNSPECIFIED HEPATIC CIRRHOSIS TYPE (HCC): Primary | ICD-10-CM

## 2019-08-05 DIAGNOSIS — R18.8 CIRRHOSIS OF LIVER WITH ASCITES, UNSPECIFIED HEPATIC CIRRHOSIS TYPE (HCC): Primary | ICD-10-CM

## 2019-08-05 DIAGNOSIS — K74.60 CIRRHOSIS OF LIVER WITH ASCITES, UNSPECIFIED HEPATIC CIRRHOSIS TYPE (HCC): ICD-10-CM

## 2019-08-05 DIAGNOSIS — R18.8 CIRRHOSIS OF LIVER WITH ASCITES, UNSPECIFIED HEPATIC CIRRHOSIS TYPE (HCC): ICD-10-CM

## 2019-08-05 LAB
ALBUMIN SERPL-MCNC: 2.8 G/DL (ref 3.4–5)
ALBUMIN/GLOB SERPL: 0.8 {RATIO} (ref 0.8–1.7)
ALP SERPL-CCNC: 200 U/L (ref 45–117)
ALT SERPL-CCNC: 33 U/L (ref 13–56)
ANION GAP SERPL CALC-SCNC: 8 MMOL/L (ref 3–18)
AST SERPL-CCNC: 84 U/L (ref 10–38)
BASOPHILS # BLD: 0 K/UL (ref 0–0.1)
BASOPHILS NFR BLD: 1 % (ref 0–2)
BILIRUB DIRECT SERPL-MCNC: 2.7 MG/DL (ref 0–0.2)
BILIRUB SERPL-MCNC: 4.4 MG/DL (ref 0.2–1)
BUN SERPL-MCNC: 26 MG/DL (ref 7–18)
BUN/CREAT SERPL: 17 (ref 12–20)
CALCIUM SERPL-MCNC: 9 MG/DL (ref 8.5–10.1)
CHLORIDE SERPL-SCNC: 95 MMOL/L (ref 100–111)
CO2 SERPL-SCNC: 33 MMOL/L (ref 21–32)
CREAT SERPL-MCNC: 1.52 MG/DL (ref 0.6–1.3)
DIFFERENTIAL METHOD BLD: ABNORMAL
EOSINOPHIL # BLD: 0.1 K/UL (ref 0–0.4)
EOSINOPHIL NFR BLD: 1 % (ref 0–5)
ERYTHROCYTE [DISTWIDTH] IN BLOOD BY AUTOMATED COUNT: 17.4 % (ref 11.6–14.5)
GLOBULIN SER CALC-MCNC: 3.4 G/DL (ref 2–4)
GLUCOSE SERPL-MCNC: 78 MG/DL (ref 74–99)
HCT VFR BLD AUTO: 27.2 % (ref 35–45)
HGB BLD-MCNC: 8.9 G/DL (ref 12–16)
INR PPP: 1.5 (ref 0.8–1.2)
LYMPHOCYTES # BLD: 1.1 K/UL (ref 0.9–3.6)
LYMPHOCYTES NFR BLD: 19 % (ref 21–52)
MCH RBC QN AUTO: 33.2 PG (ref 24–34)
MCHC RBC AUTO-ENTMCNC: 32.7 G/DL (ref 31–37)
MCV RBC AUTO: 101.5 FL (ref 74–97)
MONOCYTES # BLD: 0.8 K/UL (ref 0.05–1.2)
MONOCYTES NFR BLD: 14 % (ref 3–10)
NEUTS SEG # BLD: 3.8 K/UL (ref 1.8–8)
NEUTS SEG NFR BLD: 65 % (ref 40–73)
PLATELET # BLD AUTO: 82 K/UL (ref 135–420)
PMV BLD AUTO: 9.2 FL (ref 9.2–11.8)
POTASSIUM SERPL-SCNC: 3.8 MMOL/L (ref 3.5–5.5)
PROT SERPL-MCNC: 6.2 G/DL (ref 6.4–8.2)
PROTHROMBIN TIME: 18.3 SEC (ref 11.5–15.2)
RBC # BLD AUTO: 2.68 M/UL (ref 4.2–5.3)
SODIUM SERPL-SCNC: 136 MMOL/L (ref 136–145)
WBC # BLD AUTO: 5.8 K/UL (ref 4.6–13.2)

## 2019-08-05 PROCEDURE — 80048 BASIC METABOLIC PNL TOTAL CA: CPT

## 2019-08-05 PROCEDURE — 85610 PROTHROMBIN TIME: CPT

## 2019-08-05 PROCEDURE — 85025 COMPLETE CBC W/AUTO DIFF WBC: CPT

## 2019-08-05 PROCEDURE — 80076 HEPATIC FUNCTION PANEL: CPT

## 2019-08-05 PROCEDURE — 36415 COLL VENOUS BLD VENIPUNCTURE: CPT

## 2019-08-05 RX ORDER — CLINDAMYCIN HYDROCHLORIDE 300 MG/1
300 CAPSULE ORAL 4 TIMES DAILY
Qty: 28 CAP | Refills: 0 | Status: SHIPPED | OUTPATIENT
Start: 2019-08-05 | End: 2019-08-12

## 2019-08-05 NOTE — PROGRESS NOTES
[address] [facility] [place], MD, MD Lorena Orellana PA-C Cherryl Come, ACNP-BC     April S Franklyn, [facility] CorporationNP-BC   Lindsay Corcoran, FNP-BRENDA [facility]       [address]    at [place] [address] [address] [facility], 81 [facility], [facility] 22.    [phone]    FAX: [address] [place] [address] Avenue    at [facility]    [address], [address]    98 [facility], [address]    [phone]    FAX: [phone]       Patient Care Team:  Kristie Rehman MD as PCP - General (Family Practice)      Problem List  Date Reviewed: 6/1/2019          Codes Class Noted    Cirrhosis ([facility].) ICD-10-CM: K74.60  ICD-9-CM: 571.5  3/31/2019        COPD (chronic obstructive pulmonary disease) ([facility].) ICD-10-CM: J44.9  ICD-9-CM: 496  3/31/2019        Hypertension ICD-10-CM: I10  ICD-9-CM: 401.9  3/31/2019        Hyperlipidemia ICD-10-CM: E78.5  ICD-9-CM: 272.4  3/31/2019        Type 2 diabetes mellitus without complication ([facility].) [id number]-[id number]: E11.9  ICD-9-CM: 250.00  3/31/2019        GERD (gastroesophageal reflux disease) ICD-10-CM: K21.9  ICD-9-CM: 530.81  3/31/2019        Asthma ICD-10-CM: J45.909  ICD-9-CM: 493.90  3/31/2019        Thrombocytopenia ([facility].) ICD-10-CM: D69.6  ICD-9-CM: 287.5  3/31/2019        Anasarca ICD-10-CM: R60.1  ICD-9-CM: 782.3  3/31/2019        Obesity, morbid ([facility].) ICD-10-CM: E66.01  ICD-9-CM: 278.01  3/27/2019              Sueelljuan carlos Kincaid returns to the [address] for management of cirrhosis of undefined etiology. The active problem list, all pertinent past medical history, medications, radiologic findings and laboratory findings related to the liver disorder were reviewed with the patient. The patient is a 71 y.o.   female who was found to have chronic liver disease and cirrhosis in 3/2019 when she developed weight gain and anasarca. The most recent imaging of the liver was Ultrasound performed in 4/2019. Results suggest cirrhosis. No liver mass lesions noted. Serologic evaluation for markers of chronic liver disease was positive for CRYSTAL, ASMA. The patient has developed the following complications of cirrhosis: edema. The patient notes fatigue, swelling of the lower extremities, yellowing of the eyes or skin. The patient has not experienced pain in the right side over the liver. The patient completes all daily activities without any functional limitations. ASSESSMENT AND PLAN:  Cirrhosis  Cirrhosis of unclear etiology. The diagnosis of cirrhosis is based upon imaging, laboratory studies, complications of cirrhosis. The CTP is 10. Child class C. The MELD score is 17. Have performed laboratory testing to monitor liver function and degree of liver injury. This included BMP, hepatic panel, CBC with platelet count, INR. AST is elevated. ALT is normal. ALP is elevated. Liver function is depressed. The platelet count is depressed. HPylori positive  Patient has completed medications for treatment. Recommend patient follow up with GI for confirmation of eradication. Cellulitis  Right lower leg. Area is red, warm, tender to touch. Purulent drainage noted. Patient will start antibiotic for treatment. Autoimmune Hepatitis   The diagnosis is suspected. It was based upon serology of CRYSTAL and ASMA   Histologic confirmation has not been performed. The patient is not currently receiving treatment for the liver disease. AST is elevated. ALT is normal. ALP is elevated. Liver function is depressed. Total bilirubin is elevated. Serum albumin is depressed. INR is prolonged. The platelet count is depressed.        Given her body weight and decompensated cirrhosis there is no plan to treat with immune suppression. There is therefore no reason to perform liver biopsy at this time. Elastography performed in 4/2019 suggests stage stage 1 fibrosis. This is likely inaccurate because of her obesity and anasarca at the time of the exam.  Will consider repeat elastography after resolution of all anasarca. Ascites   Ascites is refractory to current doses of diuretics because of kidney disease   Patient instructed to stop diuretics. The patient was counseled regarding the need to maintain sodium restriction and the types of foods containing high amounts of sodium to be avoided. Lower extremity edema  Edema is refractory to the current doses of diuretics because of kidney disease     Screening for Esophageal varices   The last EGD to assess for varices was performed in 6/2019. Small esophageal varices noted. No banding. Repeat endoscopy to reassess varices and need for banding in 1 year. Hepatic encephalopathy   This has recently developed. Treatment with lactulose and Xifaxan were started while the patient was in the hospital.  We will continue treatment with lactulose and Xifaxan at this time. The patient was instructed not to operate a motor vehicle because of HE which poses an increased risk for MVA. Thrombocytopenia   This is secondary to cirrhosis. There is no evidence of overt bleeding. No treatment is required. Anemia   This is due to portal hypertension with chronic GI blood loss  The serum ferritin is depressed  The FE saturation is depressed  Will administer oral iron supplements. If the patient does not respond to oral FE will switch to intravenous iron. Screening for Hepatocellular Carcinoma  HCC screening has recently been performed and does not suggest [address] The next liver imaging study will be performed in 9/2019.     Treatment of other medical problems in patients with chronic liver disease  There are no contraindications for the patient to take most medications that are necessary for treatment of other medical issues. The patient has cirrhrosis and should avoid the following medications:  NSAIDs which are associated with a higher rate of developing ROSELINE. The patient can take the following medications as these will not impact the patient's current liver disease. Any medications utilized for treatment of DM  Statins to treat hypercholesterolemia  The patient does not consume alcohol on a daily basis. Normal doses of acetaminophen, as recommended on the label of the bottle, are not hepatotoxic except in the setting of daily alcohol use, even in patients with cirrhosis and can be utilized for pain. Counseling for alcohol in patients with chronic liver disease  The patient has cirrhosis and was advised to be abstinent from all alcohol including non-alcoholic beer which does contain some alcohol. The patient has not consumed alcohol since 2001. Osteoporosis  The risk of osteoporosis is increased in patients with cirrhosis. DEXA bone density to assess for osteoporosis has not been performed. This should be ordered by the patients primary care physician. Vaccinations   Vaccination for viral hepatitis B is recommended since the patient has no serologic evidence of previous exposure or vaccination with immunity. Vaccination for viral hepatitis A is not needed. The patient has serologic evidence of prior exposure or vaccination with immunity. Routine vaccinations against other bacterial and viral agents can be performed as indicated. Annual flu vaccination should be administered if indicated. ALLERGIES  Allergies   Allergen Reactions    Benadryl [Diphenhydramine Hcl] Anaphylaxis    Ibuprofen Anaphylaxis    Bactrim [Sulfamethoprim] Rash    Penicillins Angioedema       MEDICATIONS  Current Outpatient Medications   Medication Sig    rifAXIMin (XIFAXAN) 550 mg tablet Take 1 Tab by mouth two (2) times a day.  Indications: impaired brain function due to liver disease    lactulose (CHRONULAC) 10 gram/15 mL solution Take  by mouth two (2) times a day.  omeprazole (PRILOSEC) 40 mg capsule Take 40 mg by mouth daily.  traMADol (ULTRAM) 50 mg tablet Take 50 mg by mouth every six (6) hours as needed for Pain.  iron polysaccharides (NIFEREX) 150 mg iron capsule Take 150 mg by mouth two (2) times a day.  albuterol (PROVENTIL HFA, VENTOLIN HFA, PROAIR HFA) 90 mcg/actuation inhaler Take  by inhalation.  allopurinol (ZYLOPRIM) 100 mg tablet Take  by mouth daily.  cyclobenzaprine (FLEXERIL) 10 mg tablet Take  by mouth three (3) times daily as needed for Muscle Spasm(s).  fluticasone propionate (FLOVENT DISKUS) 50 mcg/actuation inhaler Take  by inhalation.  fluticasone propion-salmeterol (ADVAIR) 500-50 mcg/dose diskus inhaler Take 1 Puff by inhalation every twelve (12) hours.  furosemide (LASIX) 40 mg tablet Take  by mouth daily.  gabapentin (NEURONTIN) 300 mg capsule Take 300 mg by mouth three (3) times daily.  lancets misc by Does Not Apply route.  lisinopril (PRINIVIL, ZESTRIL) 10 mg tablet Take  by mouth daily.  metFORMIN (GLUCOPHAGE) 500 mg tablet Take  by mouth two (2) times daily (with meals).  spironolactone (ALDACTONE) 100 mg tablet Take 3 Tabs by mouth daily. Indications: Accumulation of Fluid caused by Cirrhosis of the Liver     No current facility-administered medications for this visit. SYSTEM REVIEW NOT RELATED TO LIVER DISEASE OR REVIEWED ABOVE:  Constitution systems: Negative for fever, chills,   Eyes: Negative for visual changes. ENT: Negative for sore throat, painful swallowing. Respiratory: Negative for cough, hemoptysis,   Cardiology: Negative for chest pain, palpitations. GI:  Negative for constipation or diarrhea. : Negative for urinary frequency, dysuria, hematuria, nocturia. Skin: Negative for rash.   Hematology: easy bruising,   Musculo-skelatal: Negative for back pain, muscle pain, weakness. Neurologic: Negative for headaches, dizziness, vertigo, memory problems not related to HE. Psychology: Negative for anxiety, depression. FAMILY HISTORY:  The father  of unknown cause. The mother  of cirrhosis r/t alcohol. There is no family history of immune disorders. SOCIAL HISTORY:  The patient is . The patient has 2 children, and 12 grandchildren. The patient stopped using tobacco products in . The patient has never consumed significant amounts of alcohol. The patient has been abstinent from alcohol since . The patient retired in  a  at [employer]. PHYSICAL EXAMINATION:  Visit Vitals  /45 (BP 1 Location: Right arm, BP Patient Position: Sitting)   Pulse 96   Temp 97.6 °F (36.4 °C) (Tympanic)   Resp 20   Ht 5' 5\" (1.651 m)   Wt 276 lb (125.2 kg)   SpO2 96%   BMI 45.93 kg/m²     General: No acute distress. Eyes: Sclera anicteric. ENT: No oral lesions. Thyroid normal.  Nodes: No adenopathy. Skin: No spider angiomata. No jaundice. No palmar erythema. Respiratory: Lungs clear to auscultation. Cardiovascular: Regular heart rate. No murmurs. No JVD. Abdomen: Soft non-tender. Unable to palpate for liver or spleen due to body habitus. No obvious ascites  Extremities: pitting edema  Neurologic: Alert and oriented. Cranial nerves grossly intact. No asterixis.     LABORATORY STUDIES:  Liver [place] of [address] Units 2019   WBC 4.6 - 13.2 K/uL 5.8 5.3   ANC 1.8 - 8.0 K/UL 3.8 3.4   HGB 12.0 - 16.0 g/dL 8.9 (L) 9.1 (L)    - 420 K/uL 82 (L) 93 (L)   INR 0.8 - 1.2   1.5 (H) 1.6 (H)   AST 10 - 38 U/L 84 (H) 97 (H)   ALT 13 - 56 U/L 33 43   Alk Phos 45 - 117 U/L 200 (H) 202 (H)   Bili, Total 0.2 - 1.0 MG/DL 4.4 (H) 5.1 (H)   Bili, Direct 0.0 - 0.2 MG/DL 2.7 (H) 2.7 (H)   Albumin 3.4 - 5.0 g/dL 2.8 (L) 2.8 (L)   BUN 7.0 - 18 MG/DL 26 (H) 10   Creat 0.6 - 1.3 MG/DL 1.52 (H) 0.93   Na 136 - 145 mmol/L 136 137   K 3.5 - 5.5 mmol/L 3.8 3.4 (L)   Cl 100 - 111 mmol/L 95 (L) 100   CO2 21 - 32 mmol/L 33 (H) 31   Glucose 74 - 99 mg/dL 78 86   Ammonia 11 - 32 UMOL/L  98 (H)     SEROLOGIES:  3/2019. HBsurface antigen negative, anti-HCV negative    Serologies Latest Ref Rng & Units 3/27/2019   Hep A Ab, Total NEGATIVE   Positive (A)   Hep B Core Ab, Total NEGATIVE   NEGATIVE   Hep B Surface Ab >10.0 mIU/mL <3.10 (L)   Hep B Surface Ab Interp POS   NEGATIVE (A)   Ferritin 8 - 388 NG/ML 23   Iron % Saturation % 11   CRYSTAL, IFA  Positive (A)   CRYSTAL Pattern  1:160 (H)   C-ANCA Neg:<1:20 titer <1:20   P-ANCA Neg:<1:20 titer <1:20   ANCA Neg:<1:20 titer <1:20   ASMCA 0 - 19 Units 26 (H)   M2 Ab 0.0 - 20.0 Units <20.0   Ceruloplasmin 19.0 - 39.0 mg/dL 24.7   Alpha-1 antitrypsin level 90 - 200 mg/dL 171     LIVER HISTOLOGY:  2019. Shear wave elastography performed at [facility]. E range:3.16- 10.33 kPa. E mean:6.58 kPa. E median:7.56 kPa. E standard: 2.49 kPa. The results suggested a fibrosis level of F1.    ENDOSCOPIC PROCEDURES:  2019. EGD performed by [facility]. Small esophageal varices. No gastric varices. Mild portal gastropathy. Gastritis. Biopsy for HPylori was positive. 2019. EGD performed by Dr. Beverly Espinoza. One [Bleeding] duodenal ulcer with pigmented material.    RADIOLOGY:  2018. Ultrasound of liver. Echogenic consistent with cirrhosis. No liver mass lesions. No dilated bile ducts. No ascites. 3/2019. CT scan abdomen with and without IV contrast. Changes consistent with cirrhosis. No liver mass lesions. No dilated bile ducts. Mild ascites. OTHER TESTIN2018. ECHO- normal    FOLLOW-UP:  All of the issues listed above in the Assessment and Plan were discussed with the patient. All questions were answered. The patient expressed a clear understanding of the above. [facility] 87 in 2 weeks to monitor.        Alison Guevara, BEKAH-BC  [address] [facility] [place] of [address] [place] [address] Road,[address]  4 [facility], [address]  98 Marita Perry, [address]  [phone]

## 2019-08-05 NOTE — PROGRESS NOTES
Alec Aleman is a 71 y.o. female      1. Have you been to the ER, urgent care clinic or hospitalized since your last visit? YES. Patient has been to [facility] ED since last visit for bleeding in mouth. 2. Have you seen or consulted any other health care providers outside of the [address] [place] [address] since your last visit (Include any pap smears or colon screening)?  NO          Learning Assessment 4/24/2019   PRIMARY LEARNER Patient   BARRIERS PRIMARY LEARNER NONE   CO-LEARNER CAREGIVER No   PRIMARY LANGUAGE ENGLISH   LEARNER PREFERENCE PRIMARY LISTENING   ANSWERED BY PATIENT   RELATIONSHIP SELF

## 2021-04-21 NOTE — PROGRESS NOTES
[address] [place] [address], MD, FACP, [facility], [place]      THAD Phelps President, [facility]     April Artis Coffey, NICKY Sanchez, NICKY Rosenberg, NICKY [facility] 136    at [address], [facility], [facility] 22.    [phone]    FAX: [address] [place] [address], [address]    [phone]    FAX: [phone]         Patient Care Team:  Tabitha Bauer MD as PCP - General (Family Practice)      Problem List  Date Reviewed: 6/1/2019          Codes Class Noted    Cirrhosis ([facility] 75.) ICD-10-CM: K74.60  ICD-9-CM: 571.5  3/31/2019        COPD (chronic obstructive pulmonary disease) ([facility].) ICD-10-CM: J44.9  ICD-9-CM: 496  3/31/2019        Hypertension ICD-10-CM: I10  ICD-9-CM: 401.9  3/31/2019        Hyperlipidemia ICD-10-CM: E78.5  ICD-9-CM: 272.4  3/31/2019        Type 2 diabetes mellitus without complication ([facility].) WCQ-29-UF: E11.9  ICD-9-CM: 250.00  3/31/2019        GERD (gastroesophageal reflux disease) ICD-10-CM: K21.9  ICD-9-CM: 530.81  3/31/2019        Asthma ICD-10-CM: J45.909  ICD-9-CM: 493.90  3/31/2019        Thrombocytopenia ([facility] ICD-10-CM: D69.6  ICD-9-CM: 287.5  3/31/2019        Anasarca ICD-10-CM: R60.1  ICD-9-CM: 782.3  3/31/2019        Obesity, morbid ([facility] ICD-10-CM: E66.01  ICD-9-CM: 278.01  3/27/2019              Tonya Kincaid returns to the [address] for management of cirrhosis of undefined etiology. The active problem list, all pertinent past medical history, medications, radiologic findings and laboratory findings related to the liver disorder were reviewed with the patient. The patient is a 76 y.o.   female who was found to have chronic liver disease and cirrhosis in · Continue supportive care 3/2019 when she developed weight gain and anasarca. The most recent imaging of the liver was Ultrasound performed in 4/2019. Results suggest cirrhosis. No liver mass lesions noted. Serologic evaluation for markers of chronic liver disease was positive for CRYSTAL, ASMA. The patient has developed the following complications of cirrhosis: edema,     Since the last office appointment the patient has: The patient has had a marked improvement in edema and 50 pound weight loss with diuretics. The patient notes fatigue, swelling of the abdomen has resolved, swelling of the lower extremities has nearly resolved,     The patient has not experienced pain in the right side over the liver, yellowing of the eyes or skin,      The patient completes all daily activities without any functional limitations. ASSESSMENT AND PLAN:  Cirrhosis  Cirrhosis of unclear etiology. The diagnosis of cirrhosis is based upon imaging, laboratory studies, complications of cirrhosis. The CTP is 10. Child class C. The MELD score is 17. Have performed laboratory testing to monitor liver function and degree of liver injury. This included BMP, hepatic panel, CBC with platelet count, INR. AST is elevated. ALT is normal.  ALP is elevated. Liver function is depressed. The platelet count is depressed. Autoimmune Hepatitis   The diagnosis is supspected was based upon serology of CRYSTAL and ASMA  Histologic confirmation has not been performed. The patient is not currently receiving treatment for the liver disease. AST is elevated. ALT is normal.  ALP is elevated. Liver function is depressed. Total bilirubin is elevated. Serum albumin is depressed. INR is prolonged. The platelet count is depressed. Given her body weight and decompensated cirrhosis there is no plan to treat with immune suppression. There is therefore no reason to perform liver biopsy at this time.     Elastography performed in 4/2019 suggests stage stage 1 fibrosis. This is likely inaccurate because of her obesity and anasarca at the time of the exam.  Will consider repeat elastography after resolution of all anasarca. Ascites   Ascites has resolved with current dose of diuretics. The patient was counseled regarding the need to maintain sodium restriction and the types of foods containing high amounts of sodium to be avoided. Lower extremity edema  Edema is greatly improved on step 2 diuretics. She has lost about 50 pounds since the initial appointment 4 weeks ago. There is still mild edema. Will continue the current dose of diuretics at step 3. The renal function is normal and edema should be able to be controled with diuretics. Screening for Esophageal varices   The patient has not had an EGD to screen for varices. Will schedule for EGD to assess for varices and need for banding. Hepatic encephalopathy   HE has not developed to date. There is no need for treatment with lactulose and/or Xifaxan at this time. There is no need to restrict dietary protein at this time. Thrombocytopenia   This is secondary to cirrhosis. There is no evidence of overt bleeding. No treatment is required. Anemia   This is due to portal hypertension with chronic GI blood loss  The serum ferritin is depressed  The FE saturation is depressed  Will administer oral iron supplements. If the patient does not respond to oral FE will switch to intravenous iron. Will schedule for EGD to assess for UGI blood loss. Screening for Hepatocellular Carcinoma  HCC screening has recently been performed and does not suggest [address] The next liver imaging study will be performed in 9/2019. Treatment of other medical problems in patients with chronic liver disease  There are no contraindications for the patient to take most medications that are necessary for treatment of other medical issues.     The patient has cirrhrosis and should avoid the following medications:  NSAIDs which are associated with a higher rate of developing ROSELINE. The patient can take the following medications as these will not impact the patient's current liver disease. Any medications utilized for treatment of DM  Statins to treat hypercholesterolemia    The patient does not consume alcohol on a daily basis. Normal doses of acetaminophen, as recommended on the label of the bottle, are not hepatotoxic except in the setting of daily alcohol use, even in patients with cirrhosis and can be utilized for pain. Counseling for alcohol in patients with chronic liver disease  The patient has cirrhosis and was advised to be abstinent from all alcohol including non-alcoholic beer which does contain some alcohol. The patient has not consumed alcohol since 2001. Osteoporosis  The risk of osteoporosis is increased in patients with cirrhosis. DEXA bone density to assess for osteoporosis has not been performed. This should be ordered by the patients primary care physician. Vaccinations   Vaccination for viral hepatitis B is recommended since the patient has no serologic evidence of previous exposure or vaccination with immunity. Vaccination for viral hepatitis A is not needed. The patient has serologic evidence of prior exposure or vaccination with immunity. Routine vaccinations against other bacterial and viral agents can be performed as indicated. Annual flu vaccination should be administered if indicated. ALLERGIES  Allergies   Allergen Reactions    Benadryl [Diphenhydramine Hcl] Anaphylaxis    Ibuprofen Anaphylaxis    Bactrim [Sulfamethoprim] Rash    Penicillins Angioedema       MEDICATIONS  Current Outpatient Medications   Medication Sig    furosemide (LASIX) 40 mg tablet Take  by mouth daily.  gabapentin (NEURONTIN) 300 mg capsule Take 300 mg by mouth three (3) times daily.  lancets misc by Does Not Apply route.     lisinopril (PRINIVIL, ZESTRIL) 10 mg tablet Take  by mouth daily.  simvastatin (ZOCOR) 40 mg tablet Take  by mouth nightly.  spironolactone (ALDACTONE) 100 mg tablet Take 3 Tabs by mouth daily. Indications: Accumulation of Fluid caused by Cirrhosis of the Liver    albuterol (PROVENTIL HFA, VENTOLIN HFA, PROAIR HFA) 90 mcg/actuation inhaler Take  by inhalation.  allopurinol (ZYLOPRIM) 100 mg tablet Take  by mouth daily.  aspirin 81 mg chewable tablet Take 81 mg by mouth daily.  cyclobenzaprine (FLEXERIL) 10 mg tablet Take  by mouth three (3) times daily as needed for Muscle Spasm(s).  diclofenac (VOLTAREN) 1 % gel Apply  to affected area four (4) times daily.  fluticasone propionate (FLOVENT DISKUS) 50 mcg/actuation inhaler Take  by inhalation.  fluticasone propion-salmeterol (ADVAIR) 500-50 mcg/dose diskus inhaler Take 1 Puff by inhalation every twelve (12) hours.  iron polysaccharides (NIFEREX) 150 mg iron capsule Take 150 mg by mouth two (2) times a day.  metFORMIN (GLUCOPHAGE) 500 mg tablet Take  by mouth two (2) times daily (with meals).  omeprazole (PRILOSEC) 40 mg capsule Take 40 mg by mouth daily. No current facility-administered medications for this visit. SYSTEM REVIEW NOT RELATED TO LIVER DISEASE OR REVIEWED ABOVE:  Constitution systems: Negative for fever, chills,   Eyes: Negative for visual changes. ENT: Negative for sore throat, painful swallowing. Respiratory: Negative for cough, hemoptysis,   Cardiology: Negative for chest pain, palpitations. GI:  Negative for constipation or diarrhea. : Negative for urinary frequency, dysuria, hematuria, nocturia. Skin: Negative for rash. Hematology: easy bruising,   Musculo-skelatal: Negative for back pain, muscle pain, weakness. Neurologic: Negative for headaches, dizziness, vertigo, memory problems not related to HE. Psychology: Negative for anxiety, depression. FAMILY HISTORY:  The father  of unknown cause.   The mother  of cirrhosis r/t alcohol. There is no family history of immune disorders. SOCIAL HISTORY:  The patient is . The patient has 2 children, and 12 grandchildren. The patient stopped using tobacco products in 1999. The patient has never consumed significant amounts of alcohol. The patient has been abstinent from alcohol since 2001. The patient retired in 2000 a  at [employer]. PHYSICAL EXAMINATION:  Visit Vitals  /54 (BP 1 Location: Right arm, BP Patient Position: Sitting)   Pulse 94   Temp 98.8 °F (37.1 °C) (Tympanic)   Resp 18   Ht 5' 5\" (1.651 m)   Wt 278 lb (126.1 kg)   SpO2 96%   BMI 46.26 kg/m²     General: No acute distress. Eyes: Sclera anicteric. ENT: No oral lesions. Thyroid normal.  Nodes: No adenopathy. Skin: No spider angiomata. No jaundice. No palmar erythema. Respiratory: Lungs clear to auscultation. Cardiovascular: Regular heart rate. No murmurs. No JVD. Abdomen: Soft non-tender. Unable to palpate for liver or spleen due to body habitus. No obvious ascites  Extremities: 1+ edema   Neurologic: Alert and oriented. Cranial nerves grossly intact. No asterixis. LABORATORY STUDIES:  Liver [place] of [address] [place] [address] & Units 4/24/2019 4/8/2019   WBC 4.6 - 13.2 K/uL 4.7 4.8   ANC 1.8 - 8.0 K/UL 3.0 3.1   HGB 12.0 - 16.0 g/dL 10.3 (L) 10.2 (L)    - 420 K/uL 105 (L) 122 (L)   INR 0.8 - 1.2   1.4 (H) 1.5 (H)   AST 15 - 37 U/L 92 (H) 99 (H)   ALT 13 - 56 U/L 39 40   Alk Phos 45 - 117 U/L 142 (H) 170 (H)   Bili, Total 0.2 - 1.0 MG/DL 3.2 (H) 3.4 (H)   Bili, Direct 0.0 - 0.2 MG/DL 1.7 (H) 1.8 (H)   Albumin 3.4 - 5.0 g/dL 2.7 (L) 2.6 (L)   BUN 7.0 - 18 MG/DL 9 10   Creat 0.6 - 1.3 MG/DL 0.80 0.88   Na 136 - 145 mmol/L 139 137   K 3.5 - 5.5 mmol/L 3.5 3.4 (L)   Cl 100 - 108 mmol/L 99 (L) 98 (L)   CO2 21 - 32 mmol/L 29 30   Glucose 74 - 99 mg/dL 78 91     SEROLOGIES:  3/2019.   HBsurface antigen negative, anti-HCV negative    Serologies Latest Ref Rng & Units 3/27/2019   Hep A Ab, Total NEGATIVE   Positive (A)   Hep B Core Ab, Total NEGATIVE   NEGATIVE   Hep B Surface Ab >10.0 mIU/mL <3.10 (L)   Hep B Surface Ab Interp POS   NEGATIVE (A)   Ferritin 8 - 388 NG/ML 23   Iron % Saturation % 11   CRYSTAL, IFA  Positive (A)   CRYSTAL Pattern  1:160 (H)   C-ANCA Neg:<1:20 titer <1:20   P-ANCA Neg:<1:20 titer <1:20   ANCA Neg:<1:20 titer <1:20   ASMCA 0 - 19 Units 26 (H)   M2 Ab 0.0 - 20.0 Units <20.0   Ceruloplasmin 19.0 - 39.0 mg/dL 24.7   Alpha-1 antitrypsin level 90 - 200 mg/dL 171     LIVER HISTOLOGY:  2019. Sheer wave elastography performed at [facility]. E range: 3.16- 10.33 kPa. E mean: 6.58 kPa. E median: 7.56 kPa. E standard: 2.49 kPa. The results suggested a fibrosis level of F1.    ENDOSCOPIC PROCEDURES:  Not available or performed    RADIOLOGY:  2018. Ultrasound of liver. Echogenic consistent with cirrhosis. No liver mass lesions. No dilated bile ducts. No ascites. 3/2019. CT scan abdomen with and without IV contrast. Changes consistent with cirrhosis. No liver mass lesions. No dilated bile ducts. Mild ascites. OTHER TESTIN2018. ECHO- normal    FOLLOW-UP:  All of the issues listed above in the Assessment and Plan were discussed with the patient. All questions were answered. The patient expressed a clear understanding of the above. [facility] 87 in 4 weeks for monitoring of cirrhosis.       Laura Suarez MD  [address]  4 [facility], [address] [place] [address], [address]  12 [facility]

## (undated) DEVICE — MAJ-1414 SINGLE USE ADPATER BIOPSY VALV: Brand: SINGLE USE ADAPTOR BIOPSY VALVE

## (undated) DEVICE — MEDI-VAC NON-CONDUCTIVE SUCTION TUBING: Brand: CARDINAL HEALTH

## (undated) DEVICE — SINGLE PORT MANIFOLD: Brand: NEPTUNE 2

## (undated) DEVICE — FORCEPS BX CAP 240CM L RAD JAW 4

## (undated) DEVICE — SPONGE GZ W4XL4IN COT 12 PLY TYP VII WVN C FLD DSGN

## (undated) DEVICE — KENDALL RADIOLUCENT FOAM MONITORING ELECTRODE RECTANGULAR SHAPE: Brand: KENDALL

## (undated) DEVICE — ENDO CARRY-ON PROCEDURE KIT INCLUDES ENZYMATIC SPONGE, GAUZE, BIOHAZARD LABEL, TRAY, LUBRICANT, DIRTY SCOPE LABEL, WATER LABEL, TRAY, DRAWSTRING PAD, AND DEFENDO 4-PIECE KIT.: Brand: ENDO CARRY-ON PROCEDURE KIT

## (undated) DEVICE — TRAP SPEC COLL POLYP POLYSTYR --

## (undated) DEVICE — MOUTHPIECE ENDOSCP 20X27MM --